# Patient Record
Sex: MALE | Race: WHITE | NOT HISPANIC OR LATINO | Employment: FULL TIME | URBAN - METROPOLITAN AREA
[De-identification: names, ages, dates, MRNs, and addresses within clinical notes are randomized per-mention and may not be internally consistent; named-entity substitution may affect disease eponyms.]

---

## 2017-11-27 ENCOUNTER — OFFICE VISIT (OUTPATIENT)
Dept: URGENT CARE | Age: 32
End: 2017-11-27
Payer: COMMERCIAL

## 2017-11-27 PROCEDURE — S9083 URGENT CARE CENTER GLOBAL: HCPCS | Performed by: FAMILY MEDICINE

## 2017-11-27 PROCEDURE — G0382 LEV 3 HOSP TYPE B ED VISIT: HCPCS | Performed by: FAMILY MEDICINE

## 2017-11-29 NOTE — PROGRESS NOTES
Assessment    1  URTI (acute upper respiratory infection) (465 9) (J06 9)    Plan  URTI (acute upper respiratory infection)    · Azithromycin 250 MG Oral Tablet (Zithromax Z-Malachi); TAKE 2 TABLETS ON DAY 1THEN TAKE 1 TABLET A DAY FOR 4 DAYS   · Continue with our present treatment plan ; Status:Complete;   Done: 70ZCB1096   · Drink plenty of fluids ; Status:Complete;   Done: 47PYI8156   · Resume activity to your tolerance ; Status:Complete;   Done: 87HEJ8424   · Use a cool mist humidifier in the room ; Status:Complete;   Done: 16UTP9027   · We suggest that you try a probiotic supplement ; Status:Complete;   Done: 37HIF2952    Discussion/Summary  Discussion Summary:   Continue over-the-counter medications as directed  If your symptoms are persisting or worsening then start the Z-Malachi as directed  Medication Side Effects Reviewed: Possible side effects of new medications were reviewed with the patient/guardian today  Understands and agrees with treatment plan: The treatment plan was reviewed with the patient/guardian  The patient/guardian understands and agrees with the treatment plan   Counseling Documentation With Imm: The patient was counseled regarding instructions for management,-- prognosis,-- patient and family education,-- impressions,-- risks and benefits of treatment options,-- importance of compliance with treatment  Follow Up Instructions: Follow Up with your Primary Care Provider in 3-4 days  If your symptoms worsen, go to the nearest [place] [address] Emergency Department  Chief Complaint    1  Cold Symptoms  Chief Complaint Free Text Note Form: Since Friday - c/o nasal congestion with PND, dry, scratchy throat and occ  dry cough  Noted some vertigo today  Denies fever, chills, ear pain or sinus pressure  Taking Vitamin C  History of Present Illness  HPI: The patient complains of nasal congestion postnasal drip and sore throat for the past three days   He does have an occasional cough which is dry  He did have an episode of feeling lightheaded and dizzy today but it only lasted a few seconds  He lay down and when he got up he felt better  Hospital Based Practices Required Assessment:  Pain Assessment  the patient states they have pain  The pain is located in the cough, congestion and throat  (on a scale of 0 to 10, the patient rates the pain at 7 )  Abuse And Domestic Violence Screen   Yes, the patient is safe at home  -- The patient states no one is hurting them  Depression And Suicide Screen  No, the patient has not had thoughts of hurting themself  No, the patient has not felt depressed in the past 7 days  Prefered Language is  [place]  Primary Language is  English  Cold Symptoms: Jessica Waldrop presents with complaints of cold symptoms  Associated symptoms include nasal congestion,-- post nasal drainage,-- scratchy throat,-- dry cough-- and-- swollen lymph nodes, but-- no sneezing,-- no runny nose,-- no sore throat,-- no hoarseness,-- no productive cough,-- no facial pressure,-- no facial pain,-- no headache,-- no plugged ear(s),-- no ear pain,-- no wheezing,-- no shortness of breath,-- no fatigue,-- no weakness,-- no nausea,-- no vomiting,-- no diarrhea,-- no fever-- and-- no chills  Review of Systems  Focused-Male:  Constitutional: as noted in HPI   ENT: as noted in HPI  Respiratory: as noted in HPI  Active Problems  1  Fatigue (780 79) (R53 83)   2  Headache (784 0) (R51)   3  Vitamin D deficiency (268 9) (E55 9)    Past Medical History  1  History of pharyngitis (V12 69) (Z87 09)   2  History of Otitis media, unspecified laterality  Active Problems And Past Medical History Reviewed: The active problems and past medical history were reviewed and updated today  Family History  Family History    1  Family history of Acute Myocardial Infarction (V17 3)   2  Family history of Diabetes Mellitus (V18 0)  Family History Reviewed:    The family history was reviewed and updated today  Social History   · Never A Smoker  Social History Reviewed: The social history was reviewed and updated today  Surgical History    1  History of Hernia Repair   2  History of Tonsillectomy With Adenoidectomy  Surgical History Reviewed: The surgical history was reviewed and updated today  Current Meds   1  Multi-Vitamin TABS; TAKE 1 TABLET DAILY; Therapy: (Recorded:13Mar2013) to Recorded   2  Vitamin C 500 MG Oral Tablet; Therapy: (0498 72 13 49) to Recorded  Medication List Reviewed: The medication list was reviewed and updated today  Allergies    1  No Known Drug Allergies    Vitals  Signs   Recorded: 97LJW3197 08:09PM   Temperature: 97 9 F, Oral  Heart Rate: 90  Pulse Quality: Regular  Respiration: 18  Systolic: 330, LUE, Sitting  Diastolic: 80, LUE, Sitting  Height: 5 ft 9 in  Weight: 165 lb 12 8 oz  BMI Calculated: 24 48  BSA Calculated: 1 91  O2 Saturation: 98  Pain Scale: 7    Physical Exam   Constitutional  General appearance: No acute distress, well appearing and well nourished  Eyes  Conjunctiva and lids: No swelling, erythema, or discharge  Pupils and irises: Equal, round and reactive to light  Ears, Nose, Mouth, and Throat  External inspection of ears and nose: Normal  -- TMs intact bilaterally with clear fluid in the middle ear  No erythema  -- Bilateral nasal congestion and erythema with no discharge  -- Bilateral tonsillar erythema with no soft tissue swelling no exudate  Pulmonary  Respiratory effort: No increased work of breathing or signs of respiratory distress  Auscultation of lungs: Clear to auscultation  Cardiovascular  Auscultation of heart: Normal rate and rhythm, normal S1 and S2, without murmurs  Lymphatic  Palpation of lymph nodes in neck: Abnormal   bilateral anterior cervical node enlargement, but-- no posterior cervical node enlargement,-- no submandibular node enlargement-- and-- no supraclavicular node enlargement    Psychiatric Orientation to person, place and time: Normal    Mood and affect: Normal        Signatures   Electronically signed by : MATTHEW Escalante; Nov 27 2017  8:32PM EST                       (Author)    Electronically signed by : Jerilyn Chase DO; Nov 28 2017  7:18AM EST                       (Co-author)

## 2018-02-12 ENCOUNTER — HOSPITAL ENCOUNTER (EMERGENCY)
Facility: HOSPITAL | Age: 33
Discharge: HOME | End: 2018-02-12
Attending: EMERGENCY MEDICINE
Payer: COMMERCIAL

## 2018-02-12 VITALS
WEIGHT: 160 LBS | RESPIRATION RATE: 18 BRPM | BODY MASS INDEX: 23.8 KG/M2 | SYSTOLIC BLOOD PRESSURE: 159 MMHG | TEMPERATURE: 99.4 F | OXYGEN SATURATION: 97 % | DIASTOLIC BLOOD PRESSURE: 80 MMHG | HEART RATE: 106 BPM

## 2018-02-12 DIAGNOSIS — J11.1 INFLUENZA-LIKE ILLNESS: Primary | ICD-10-CM

## 2018-02-12 PROCEDURE — 99283 EMERGENCY DEPT VISIT LOW MDM: CPT

## 2018-02-12 RX ORDER — ONDANSETRON 4 MG/1
4 TABLET, ORALLY DISINTEGRATING ORAL EVERY 6 HOURS PRN
Qty: 12 TABLET | Refills: 0 | Status: SHIPPED | OUTPATIENT
Start: 2018-02-12

## 2018-02-12 RX ORDER — ACETAMINOPHEN 325 MG/1
650 TABLET ORAL ONCE
Status: COMPLETED | OUTPATIENT
Start: 2018-02-12 | End: 2018-02-12

## 2018-02-12 RX ORDER — ONDANSETRON 4 MG/1
4 TABLET, ORALLY DISINTEGRATING ORAL ONCE
Status: COMPLETED | OUTPATIENT
Start: 2018-02-12 | End: 2018-02-12

## 2018-02-12 RX ORDER — IBUPROFEN 400 MG/1
400 TABLET ORAL ONCE
Status: COMPLETED | OUTPATIENT
Start: 2018-02-12 | End: 2018-02-12

## 2018-02-12 RX ADMIN — ACETAMINOPHEN 650 MG: 325 TABLET ORAL at 18:31

## 2018-02-12 RX ADMIN — IBUPROFEN 400 MG: 400 TABLET ORAL at 18:31

## 2018-02-12 RX ADMIN — ONDANSETRON 4 MG: 4 TABLET, ORALLY DISINTEGRATING ORAL at 18:06

## 2018-02-12 NOTE — DISCHARGE INSTRUCTIONS
Influenza   WHAT YOU NEED TO KNOW:   Influenza (the flu) is an infection caused by the influenza virus  The flu is easily spread when an infected person coughs, sneezes, or has close contact with others  You may be able to spread the flu to others for 1 week or longer after signs or symptoms appear  DISCHARGE INSTRUCTIONS:   Call 911 for any of the following:   · You have trouble breathing, and your lips look purple or blue  · You have a seizure  Seek care immediately if:   · You are dizzy, or you are urinating less or not at all  · You have a headache with a stiff neck, and you feel tired or confused  · You have new pain or pressure in your chest     · Your symptoms, such as shortness of breath, vomiting, or diarrhea, get worse  · Your symptoms, such as fever and coughing, seem to get better, but then get worse  Contact your healthcare provider if:   · You have new muscle pain or weakness  · You have questions or concerns about your condition or care  Medicines: You may need any of the following:  · Acetaminophen  decreases pain and fever  It is available without a doctor's order  Ask how much to take and how often to take it  Follow directions  Acetaminophen can cause liver damage if not taken correctly  · NSAIDs , such as ibuprofen, help decrease swelling, pain, and fever  This medicine is available with or without a doctor's order  NSAIDs can cause stomach bleeding or kidney problems in certain people  If you take blood thinner medicine, always ask your healthcare provider if NSAIDs are safe for you  Always read the medicine label and follow directions  · Antivirals  help fight a viral infection  · Take your medicine as directed  Contact your healthcare provider if you think your medicine is not helping or if you have side effects  Tell him or her if you are allergic to any medicine  Keep a list of the medicines, vitamins, and herbs you take   Include the amounts, and when and why you take them  Bring the list or the pill bottles to follow-up visits  Carry your medicine list with you in case of an emergency  Rest  as much as you can to help you recover  Drink liquids as directed  to help prevent dehydration  Ask how much liquid to drink each day and which liquids are best for you  Prevent the spread of influenza:   · Wash your hands often  Use soap and water  Wash your hands after you use the bathroom, change a child's diapers, or sneeze  Wash your hands before you prepare or eat food  Use gel hand cleanser when soap and water are not available  Do not touch your eyes, nose, or mouth unless you have washed your hands first            · Cover your mouth when you sneeze or cough  Cough into a tissue or the bend of your arm  · Clean shared items with a germ-killing   Clean table surfaces, doorknobs, and light switches  Do not share towels, silverware, and dishes with people who are sick  Wash bed sheets, towels, silverware, and dishes with soap and water  · Wear a mask  over your mouth and nose if you are sick or are near anyone who is sick  · Stay away from others  if you are sick  · Influenza vaccine  helps prevent influenza (flu)  Everyone older than 6 months should get a yearly influenza vaccine  Get the vaccine as soon as it is available, usually in September or October each year  Follow up with your healthcare provider as directed:  Write down your questions so you remember to ask them during your visits  © 2017 [address] [employer] Information is for End User's use only and may not be sold, redistributed or otherwise used for commercial purposes  All illustrations and images included in CareNotes® are the copyrighted property of [employer] , [employer]  or Alexis Thomason  The above information is an  only  It is not intended as medical advice for individual conditions or treatments   Talk to your doctor, nurse or pharmacist before following any medical regimen to see if it is safe and effective for you

## 2018-02-12 NOTE — ED PROVIDER NOTES
History  Chief Complaint   Patient presents with    Flu Symptoms     pt with sudden onset of flu like symptoms, bodyaches, nausea, cough, fever, chills     22-year-old male presents to the emergency department relating that he has developed a sudden onset flu-like illness  He relates that he has vomited a couple of times today starting at 10 or 11 in the morning  He has had chills  He relates that he has had a low-grade fever and felt extremely drain  He relates that he is extremely dehydrated and unable to keep anything down  Did have slightly loose stool  No nasal congestion, sore throat or cough  Healthy at baseline  Not on any medications  None       History reviewed  No pertinent past medical history  History reviewed  No pertinent surgical history  History reviewed  No pertinent family history  I have reviewed and agree with the history as documented  Social History   Substance Use Topics    Smoking status: Never Smoker    Smokeless tobacco: Never Used    Alcohol use No        Review of Systems   All other systems reviewed and are negative  Physical Exam  ED Triage Vitals [02/12/18 1644]   Temperature Pulse Respirations Blood Pressure SpO2   99 4 °F (37 4 °C) (!) 106 18 159/80 97 %      Temp Source Heart Rate Source Patient Position - Orthostatic VS BP Location FiO2 (%)   Oral Monitor Sitting Left arm --      Pain Score       8           Orthostatic Vital Signs  Vitals:    02/12/18 1644   BP: 159/80   Pulse: (!) 106   Patient Position - Orthostatic VS: Sitting       Physical Exam   Constitutional: He is oriented to person, place, and time  He appears well-developed and well-nourished  He appears distressed (Malaised appearing)  Eyes: Conjunctivae and EOM are normal    Neck: Normal range of motion  Cardiovascular: Regular rhythm  Mildly tachycardic  Pulmonary/Chest: Effort normal and breath sounds normal    Abdominal: Soft   Bowel sounds are normal  There is tenderness ( minimal epigastric)  There is no guarding  Lymphadenopathy:     He has no cervical adenopathy  Neurological: He is alert and oriented to person, place, and time  Skin: Skin is warm and dry  Psychiatric: He has a normal mood and affect  His behavior is normal    Nursing note and vitals reviewed  ED Medications  Medications   ondansetron (ZOFRAN-ODT) dispersible tablet 4 mg (4 mg Oral Given 2/12/18 1806)   acetaminophen (TYLENOL) tablet 650 mg (650 mg Oral Given 2/12/18 1831)   ibuprofen (MOTRIN) tablet 400 mg (400 mg Oral Given 2/12/18 1831)       Diagnostic Studies  Results Reviewed     None                 No orders to display              Procedures  Procedures       Phone Contacts  ED Phone Contact    ED Course  ED Course as of Feb 13 1852 Mon Feb 12, 2018 1915 Tolerating fluids at this time  Discussed supportive care  Patient had no further questions  Work note will be provided  MDM  CritCare Time    Disposition  Final diagnoses:   Influenza-like illness     Time reflects when diagnosis was documented in both MDM as applicable and the Disposition within this note     Time User Action Codes Description Comment    2/12/2018  6:28 PM Blake Rushing Influenza-like illness       ED Disposition     ED Disposition Condition Comment    Discharge  Yissel Adames discharge to home/self care      Condition at discharge: Good        Follow-up Information     Follow up With Specialties Details Why Contact Info    Berlin Krabbe, MD Family Medicine  if not improving in 3-5 days [address] [place] [address]  [phone]          Discharge Medication List as of 2/12/2018  7:17 PM      START taking these medications    Details   ondansetron (ZOFRAN-ODT) 4 mg disintegrating tablet Take 1 tablet (4 mg total) by mouth every 6 (six) hours as needed for nausea or vomiting, Starting Mon 2/12/2018, Normal           No discharge procedures on file      ED Provider  Electronically Signed by           Becky Teran MD  02/13/18 7561

## 2021-08-16 ENCOUNTER — OFFICE VISIT (OUTPATIENT)
Dept: FAMILY MEDICINE CLINIC | Facility: CLINIC | Age: 36
End: 2021-08-16
Payer: COMMERCIAL

## 2021-08-16 VITALS
WEIGHT: 158 LBS | OXYGEN SATURATION: 98 % | HEIGHT: 69 IN | TEMPERATURE: 97.6 F | HEART RATE: 77 BPM | DIASTOLIC BLOOD PRESSURE: 70 MMHG | SYSTOLIC BLOOD PRESSURE: 110 MMHG | BODY MASS INDEX: 23.4 KG/M2

## 2021-08-16 DIAGNOSIS — Z00.00 ANNUAL PHYSICAL EXAM: Primary | ICD-10-CM

## 2021-08-16 DIAGNOSIS — Z82.0 FAMILY HISTORY OF GUILLAIN-BARRE SYNDROME: ICD-10-CM

## 2021-08-16 PROCEDURE — 3008F BODY MASS INDEX DOCD: CPT | Performed by: PHYSICIAN ASSISTANT

## 2021-08-16 PROCEDURE — 99385 PREV VISIT NEW AGE 18-39: CPT | Performed by: PHYSICIAN ASSISTANT

## 2021-08-16 PROCEDURE — 1036F TOBACCO NON-USER: CPT | Performed by: PHYSICIAN ASSISTANT

## 2021-08-16 PROCEDURE — 3725F SCREEN DEPRESSION PERFORMED: CPT | Performed by: PHYSICIAN ASSISTANT

## 2021-08-16 NOTE — PATIENT INSTRUCTIONS

## 2021-08-16 NOTE — PROGRESS NOTES
[facility]    NAME: Patrice Garcia  AGE: 37 y o  SEX: male  : 1985     DATE: 2021     Assessment and Plan:     Problem List Items Addressed This Visit     None      Visit Diagnoses     Annual physical exam    -  Primary    Family history of Guillain-Standish syndrome        Relevant Orders    Ambulatory referral to Allergy      Pt is a 37 y o  male  Vaccines: UTD  Sexual Health: not sexually active    Discussed relative and absolute contraindications for COVID-19 vaccinations  At this time he has not absolute contraindications  He is concerned that unspecified autoimmune conditions in his family will lead to a significant reaction  Educated regarding COVID vaccination options  Encouraged patient that vaccination would likely be the best option for him  Referred to Allergist if he wants to discuss supervised vaccination or further testing  Immunizations and preventive care screenings were discussed with patient today  Appropriate education was printed on patient's after visit summary  Counseling:  Alcohol/drug use: discussed moderation in alcohol intake, the recommendations for healthy alcohol use, and avoidance of illicit drug use  Dental Health: discussed importance of regular tooth brushing, flossing, and dental visits  Sexual health: discussed sexually transmitted diseases, partner selection, use of condoms, avoidance of unintended pregnancy, and contraceptive alternatives  · Exercise: the importance of regular exercise/physical activity was discussed  Recommend exercise 3-5 times per week for at least 30 minutes  No follow-ups on file  Chief Complaint:     Chief Complaint   Patient presents with    Establish Care      History of Present Illness:     Adult Annual Physical   Patient here for a comprehensive physical exam  The patient reports no problems      Works at [employer] for 7 years  They are mandating COVID vaccine for all employees  He was not planning to get COVID and work requiring is like a "nose around my neck "    Possible prior ciguatera poisoning, after eating [place] in 2010  Evaluation at [facility]  Believes he had COVID 1 5 year ago  Mild URI symptoms with no significant allergic reaction or persistent symptoms  Mother has history of autoimmune conditions  1st Cousin has [address]  Brother believes he has recessive gene for autoimmune condition  Diet and Physical Activity  · Diet/Nutrition: well balanced diet and consuming 3-5 servings of fruits/vegetables daily  · Exercise: walking, strength training exercises and 3-4 times a week on average  Depression Screening  PHQ-9 Depression Screening    PHQ-9:   Frequency of the following problems over the past two weeks:      Little interest or pleasure in doing things: 0 - not at all  Feeling down, depressed, or hopeless: 0 - not at all  PHQ-2 Score: 0       General Health  · Sleep: sleeps well  · Hearing: normal - bilateral   · Vision: no vision problems and most recent eye exam <1 year ago  · Dental: regular dental visits and brushes teeth twice daily   Health  · History of STDs?: no      Review of Systems:     Review of Systems   Constitutional: Negative for activity change, chills, fatigue, fever and unexpected weight change  HENT: Negative for rhinorrhea and sore throat  Respiratory: Negative for cough, shortness of breath and wheezing  Cardiovascular: Negative for chest pain, palpitations and leg swelling  Gastrointestinal: Negative for constipation, diarrhea, nausea and vomiting  Musculoskeletal: Negative for back pain, neck pain and neck stiffness  Skin: Negative for rash  Allergic/Immunologic: Negative for environmental allergies and food allergies  Neurological: Negative for dizziness, weakness and headaches     Psychiatric/Behavioral: Negative for dysphoric mood and sleep disturbance  The patient is not nervous/anxious  Past Medical History:     History reviewed  No pertinent past medical history  Past Surgical History:     History reviewed  No pertinent surgical history  Social History:     Social History     Socioeconomic History    Marital status: Single     Spouse name: None    Number of children: None    Years of education: None    Highest education level: None   Occupational History    None   Tobacco Use    Smoking status: Never Smoker    Smokeless tobacco: Never Used   Vaping Use    Vaping Use: Never used   Substance and Sexual Activity    Alcohol use: No    Drug use: No    Sexual activity: Not Currently   Other Topics Concern    None   Social History Narrative    None     Social Determinants of Health     Financial Resource Strain:     Difficulty of Paying Living Expenses:    Food Insecurity:     Worried About Running Out of Food in the Last Year:     Ran Out of Food in the Last Year:    Transportation Needs:     Lack of Transportation (Medical):      Lack of Transportation (Non-Medical):    Physical Activity:     Days of Exercise per Week:     Minutes of Exercise per Session:    Stress:     Feeling of Stress :    Social Connections:     Frequency of Communication with Friends and Family:     Frequency of Social Gatherings with Friends and Family:     Attends Orthodox Services:     Active Member of Clubs or Organizations:     Attends Club or Organization Meetings:     Marital Status:    Intimate Partner Violence:     Fear of Current or Ex-Partner:     Emotionally Abused:     Physically Abused:     Sexually Abused:       Family History:     Family History   Problem Relation Age of Onset    No Known Problems Mother     No Known Problems Father       Current Medications:     Current Outpatient Medications   Medication Sig Dispense Refill    ondansetron (ZOFRAN-ODT) 4 mg disintegrating tablet Take 1 tablet (4 mg total) by mouth every 6 (six) hours as needed for nausea or vomiting (Patient not taking: Reported on 8/16/2021) 12 tablet 0     No current facility-administered medications for this visit  Allergies:     No Known Allergies   Physical Exam:     /70 (BP Location: Left arm, Patient Position: Sitting, Cuff Size: Standard)   Pulse 77   Temp 97 6 °F (36 4 °C)   Ht 5' 9" (1 753 m)   Wt 71 7 kg (158 lb)   SpO2 98%   BMI 23 33 kg/m²     Physical Exam  Constitutional:       Appearance: He is well-developed  HENT:      Head: Normocephalic and atraumatic  Mouth/Throat:      Pharynx: No oropharyngeal exudate  Eyes:      Pupils: Pupils are equal, round, and reactive to light  Neck:      Thyroid: No thyromegaly  Cardiovascular:      Rate and Rhythm: Normal rate and regular rhythm  Heart sounds: Normal heart sounds  No murmur heard  Pulmonary:      Effort: Pulmonary effort is normal  No respiratory distress  Breath sounds: Normal breath sounds  No wheezing  Abdominal:      General: Bowel sounds are normal  There is no distension  Palpations: Abdomen is soft  Tenderness: There is no abdominal tenderness  Hernia: No hernia is present  Musculoskeletal:         General: Normal range of motion  Cervical back: Normal range of motion and neck supple  Lymphadenopathy:      Cervical: No cervical adenopathy  Skin:     General: Skin is warm  Neurological:      Mental Status: He is alert and oriented to person, place, and time  Psychiatric:         Mood and Affect: Mood normal          Behavior: Behavior normal          Thought Content:  Thought content normal           Toyin Torres PA-C   [id number]

## 2022-07-28 ENCOUNTER — OFFICE VISIT (OUTPATIENT)
Dept: URGENT CARE | Age: 37
End: 2022-07-28
Payer: COMMERCIAL

## 2022-07-28 VITALS
HEIGHT: 69 IN | SYSTOLIC BLOOD PRESSURE: 110 MMHG | TEMPERATURE: 97.9 F | HEART RATE: 84 BPM | BODY MASS INDEX: 23.4 KG/M2 | DIASTOLIC BLOOD PRESSURE: 70 MMHG | RESPIRATION RATE: 18 BRPM | WEIGHT: 158 LBS

## 2022-07-28 DIAGNOSIS — N45.1 EPIDIDYMITIS: Primary | ICD-10-CM

## 2022-07-28 PROCEDURE — S9083 URGENT CARE CENTER GLOBAL: HCPCS | Performed by: NURSE PRACTITIONER

## 2022-07-28 PROCEDURE — G0382 LEV 3 HOSP TYPE B ED VISIT: HCPCS | Performed by: NURSE PRACTITIONER

## 2022-07-28 PROCEDURE — 87086 URINE CULTURE/COLONY COUNT: CPT

## 2022-07-28 PROCEDURE — 87591 N.GONORRHOEAE DNA AMP PROB: CPT

## 2022-07-28 PROCEDURE — 87491 CHLMYD TRACH DNA AMP PROBE: CPT

## 2022-07-28 RX ORDER — LEVOFLOXACIN 500 MG/1
500 TABLET, FILM COATED ORAL EVERY 24 HOURS
Qty: 7 TABLET | Refills: 0 | Status: SHIPPED | OUTPATIENT
Start: 2022-07-28 | End: 2022-08-05 | Stop reason: SDUPTHER

## 2022-07-28 NOTE — PROGRESS NOTES
[facility]        NAME: Debo Johnson is a 31 y o  male  : 1985    MRN: [id number]  DATE: 2022  TIME: 8:43 AM    Assessment and Plan   Epididymitis [N45 1]  1  Epididymitis  levofloxacin (LEVAQUIN) 500 mg tablet    Urine culture    Chlamydia/GC amplified DNA by PCR    Ambulatory referral to Urology         Patient Instructions     --Take antibiotic as prescribed  Take with food  --Warm compresses, elevated testicles 2-3 times a day  --Motrin as needed for pain  --Will send urine for culture, calling when results obtained (anticipate 48-72 hours  --Follow-up with PCP and/or urology for ongoing/recurrent symptoms over the next week  --Go to ER for any worsening pain, swelling, fever/chills, nausea, vomiting    Chief Complaint     Chief Complaint   Patient presents with    Testicle Pain     Patient been having issues with testicle pain and warmest for less then 1 week         History of Present Illness       Here with complaints of testicular pain x 1 week  Waxes and wanes  Has improved somewhat over the past couple of days  Both sides  No increase with cough, strain  Seems a little swollen and red at times  Intermittent dysuria, frequency  No associated bulges, abdominal pain, back pain, urethral discharge, fever/chills, N/V  No OTC analgesics  Sexually active  Denies  prior history of infections including STI's  No known injury/trauma, although he was mountain biking recently  Review of Systems   Review of Systems   Constitutional: Negative for fever  Gastrointestinal: Negative for abdominal pain, nausea and vomiting  Genitourinary: Positive for dysuria and frequency           Current Medications       Current Outpatient Medications:     levofloxacin (LEVAQUIN) 500 mg tablet, Take 1 tablet (500 mg total) by mouth every 24 hours for 7 days, Disp: 7 tablet, Rfl: 0    ondansetron (ZOFRAN-ODT) 4 mg disintegrating tablet, Take 1 tablet (4 mg total) by mouth every 6 (six) hours as needed for nausea or vomiting (Patient not taking: Reported on 8/16/2021), Disp: 12 tablet, Rfl: 0    Current Allergies     Allergies as of 07/28/2022    (No Known Allergies)            The following portions of the patient's history were reviewed and updated as appropriate: allergies, current medications, past family history, past medical history, past social history, past surgical history and problem list      No past medical history on file  No past surgical history on file  Family History   Problem Relation Age of Onset    No Known Problems Mother     No Known Problems Father          Medications have been verified  Objective   /70 (BP Location: Right arm, Patient Position: Sitting, Cuff Size: Standard)   Pulse 84   Temp 97 9 °F (36 6 °C)   Resp 18   Ht 5' 9" (1 753 m)   Wt 71 7 kg (158 lb)   BMI 23 33 kg/m²   No LMP for male patient  Physical Exam     Physical Exam  Abdominal:      General: Abdomen is flat  Tenderness: There is no abdominal tenderness  Genitourinary:     Penis: Normal        Comments: Testicles with mild erythema, diffuse swelling, no focal tenderness  Small, nontender cyst palpated on left spermatic cord  No unusual lumps, masses otherwise, including inguinal hernias  Negative Prehn  No urethral discharge  Neurological:      Mental Status: He is alert     Psychiatric:         Mood and Affect: Mood normal

## 2022-07-28 NOTE — PATIENT INSTRUCTIONS
--Take antibiotic as prescribed  Take with food  --Warm compresses, elevated testicles 2-3 times a day  --Motrin as needed for pain  --Will send urine for culture, calling when results obtained (anticipate 48-72 hours  --Follow-up with PCP and/or urology for ongoing/recurrent symptoms over the next week      --Go to ER for any worsening pain, swelling, fever/chills, nausea, vomiting

## 2022-07-29 LAB
BACTERIA UR CULT: NORMAL
C TRACH DNA SPEC QL NAA+PROBE: NEGATIVE
N GONORRHOEA DNA SPEC QL NAA+PROBE: NEGATIVE

## 2022-08-05 ENCOUNTER — OFFICE VISIT (OUTPATIENT)
Dept: UROLOGY | Facility: AMBULATORY SURGERY CENTER | Age: 37
End: 2022-08-05
Payer: COMMERCIAL

## 2022-08-05 ENCOUNTER — HOSPITAL ENCOUNTER (OUTPATIENT)
Dept: RADIOLOGY | Age: 37
Discharge: HOME | End: 2022-08-05
Payer: COMMERCIAL

## 2022-08-05 VITALS
WEIGHT: 158 LBS | BODY MASS INDEX: 23.4 KG/M2 | DIASTOLIC BLOOD PRESSURE: 82 MMHG | SYSTOLIC BLOOD PRESSURE: 110 MMHG | HEART RATE: 48 BPM | HEIGHT: 69 IN

## 2022-08-05 DIAGNOSIS — N45.1 EPIDIDYMITIS: ICD-10-CM

## 2022-08-05 DIAGNOSIS — R30.0 DYSURIA: ICD-10-CM

## 2022-08-05 DIAGNOSIS — N45.1 EPIDIDYMITIS: Primary | ICD-10-CM

## 2022-08-05 PROCEDURE — 76870 US EXAM SCROTUM: CPT

## 2022-08-05 PROCEDURE — 99204 OFFICE O/P NEW MOD 45 MIN: CPT | Performed by: NURSE PRACTITIONER

## 2022-08-05 RX ORDER — LEVOFLOXACIN 500 MG/1
500 TABLET, FILM COATED ORAL EVERY 24 HOURS
Qty: 7 TABLET | Refills: 0 | Status: SHIPPED | OUTPATIENT
Start: 2022-08-05 | End: 2022-08-12

## 2022-08-05 NOTE — PROGRESS NOTES
8/5/2022    Max Jose  1985  [id number]      Assessment  -Epididymitis    Discussion/Plan  Majo Marlow is a 31 y o  male who presents in consultation    1  Epididymitis- we had a lengthy discussion reviewing symptoms of acute epididymitis  Discussed the results of his prior urine culture which was negative for infection  Unsure if symptoms are secondary to epididymitis verses injury from mountain biking excursion  He expresses concern and continues to report mild symptoms as noted below  Patient is requesting repeat urine culture as well as a semen culture test   We discussed obtaining a scrotal ultrasound for further evaluation  His physical examination was unremarkable  Reviewed supportive measures with use of OTC NSAIDs, scrotal support with physical activity, and warm compress and soaks  Call with results of testing  He will then follow-up in 4 months for re-evaluation of his symptoms  Patient was advised to call sooner with any questions or issues     -All questions answered, patient agrees with plan      History of Present Illness  31 y o  male who presents in consultation today for evaluation of epididymitis  He was recently evaluated at an urgent care center on 07/28/2022 for symptoms of dysuria and testicular discomfort  Patient states he engaged in sexual activity a few weeks prior as well as mountain biking for 3 consecutive days  He notes mild redness and swelling  Patient was presumed to have epididymitis and was prescribed a 7 day course of Levaquin  Urine culture and GC chlamydia testing were negative  He continues to report symptoms of testicular pressure, discomfort and eden rectal area and feeling of burning sensation  Patient has since had sexual activity with same partner  He denies any pain with ejaculation or hematospermia  Patient denies any additional lower urinary tract symptoms or gross hematuria    He denies any prior urologic history, surgical intervention, or instrumentation  Patient denies any prior history of undescended testicle or testicular torsion  He states his grandfather had a history of prostate cancer  Review of Systems  Review of Systems   Constitutional: Negative  HENT: Negative  Respiratory: Negative  Cardiovascular: Negative  Gastrointestinal: Negative  Genitourinary: Positive for dysuria and testicular pain  Negative for decreased urine volume, difficulty urinating, flank pain, frequency, hematuria, penile discharge, penile pain, penile swelling, scrotal swelling and urgency  Musculoskeletal: Negative  Skin: Negative  Neurological: Negative  Psychiatric/Behavioral: Negative        AUA SYMPTOM SCORE    Flowsheet Row Most Recent Value   AUA SYMPTOM SCORE    How often have you had a sensation of not emptying your bladder completely after you finished urinating? 0 (P)     How often have you had to urinate again less than two hours after you finished urinating? 5 (P)     How often have you found you stopped and started again several times when you urinate? 1 (P)     How often have you found it difficult to postpone urination? 3 (P)     How often have you had a weak urinary stream? 2 (P)     How often have you had to push or strain to begin urination? 0 (P)     How many times did you most typically get up to urinate from the time you went to bed at night until the time you got up in the morning? 5 (P)     Quality of Life: If you were to spend the rest of your life with your urinary condition just the way it is now, how would you feel about that? 6 (P)     AUA SYMPTOM SCORE 16 (P)           Past Medical History  Past Medical History:   Diagnosis Date    Epididymitis 07/28/2022       Past Social History  Past Surgical History:   Procedure Laterality Date    HERNIA REPAIR  0    8 months old       Past Family History  Family History   Problem Relation Age of Onset    Urolithiasis Mother         on and off    No Known Problems Father     Cancer Maternal Grandmother         lung cancer/    Sandeep Quinn Cancer Paternal Grandfather         Prostate cancer/     Diabetes Paternal Grandfather         diagnosed in his 45s       Past Social history  Social History     Socioeconomic History    Marital status: Single     Spouse name: Not on file    Number of children: Not on file    Years of education: Not on file    Highest education level: Not on file   Occupational History    Not on file   Tobacco Use    Smoking status: Never Smoker    Smokeless tobacco: Never Used    Tobacco comment: N/A   Vaping Use    Vaping Use: Never used   Substance and Sexual Activity    Alcohol use: Not Currently     Comment: less than 2 drinks a month    Drug use: No    Sexual activity: Not Currently   Other Topics Concern    Not on file   Social History Narrative    Not on file     Social Determinants of Health     Financial Resource Strain: Not on file   Food Insecurity: Not on file   Transportation Needs: Not on file   Physical Activity: Not on file   Stress: Not on file   Social Connections: Not on file   Intimate Partner Violence: Not on file   Housing Stability: Not on file       Current Medications  Current Outpatient Medications   Medication Sig Dispense Refill    levofloxacin (LEVAQUIN) 500 mg tablet Take 1 tablet (500 mg total) by mouth every 24 hours for 7 days 7 tablet 0    ondansetron (ZOFRAN-ODT) 4 mg disintegrating tablet Take 1 tablet (4 mg total) by mouth every 6 (six) hours as needed for nausea or vomiting (Patient not taking: No sig reported) 12 tablet 0     No current facility-administered medications for this visit  Allergies  No Known Allergies    Past Medical History, Social History, Family History, medications and allergies were reviewed      Vitals  Vitals:    22 1059   BP: 110/82   Pulse: (!) 48   Weight: 71 7 kg (158 lb)   Height: 5' 9" (1 753 m)       Physical Exam  Physical Exam  Constitutional: Appearance: Normal appearance  He is well-developed  HENT:      Head: Normocephalic  Eyes:      Pupils: Pupils are equal, round, and reactive to light  Pulmonary:      Effort: Pulmonary effort is normal    Abdominal:      Palpations: Abdomen is soft  Genitourinary:     Penis: Normal        Testes: Normal       Prostate: Normal       Rectum: Normal       Comments: Penis circumcised, testicles descended bilaterally, there is no scrotal edema or erythema, scrotum nontender  Urethral meatus patent  No inguinal hernia palpated  Prostate 35 g, smooth, nontender, no nodules  Musculoskeletal:         General: Normal range of motion  Cervical back: Normal range of motion  Skin:     General: Skin is warm and dry  Neurological:      General: No focal deficit present  Mental Status: He is alert and oriented to person, place, and time  Psychiatric:         Mood and Affect: Mood normal          Behavior: Behavior normal          Thought Content: Thought content normal          Judgment: Judgment normal          Results    I have personally reviewed all pertinent lab results and reviewed with patient  No results found for: PSA  No results found for: GLUCOSE, CALCIUM, NA, K, CO2, CL, BUN, CREATININE  No results found for: WBC, HGB, HCT, MCV, PLT  No results found for this or any previous visit (from the past 1 hour(s))

## 2022-08-08 ENCOUNTER — APPOINTMENT (OUTPATIENT)
Dept: LAB | Facility: CLINIC | Age: 37
End: 2022-08-08
Payer: COMMERCIAL

## 2022-08-08 DIAGNOSIS — R30.0 DYSURIA: ICD-10-CM

## 2022-08-08 PROCEDURE — 87070 CULTURE OTHR SPECIMN AEROBIC: CPT

## 2022-08-08 PROCEDURE — 87086 URINE CULTURE/COLONY COUNT: CPT

## 2022-08-08 PROCEDURE — 87205 SMEAR GRAM STAIN: CPT

## 2022-08-09 ENCOUNTER — TELEPHONE (OUTPATIENT)
Dept: UROLOGY | Facility: AMBULATORY SURGERY CENTER | Age: 37
End: 2022-08-09

## 2022-08-09 DIAGNOSIS — N41.0 ACUTE PROSTATITIS: Primary | ICD-10-CM

## 2022-08-09 LAB — BACTERIA UR CULT: NORMAL

## 2022-08-09 NOTE — TELEPHONE ENCOUNTER
----- Message from [id number] Ivy Roseann sent at 8/8/2022  8:48 PM EDT -----  Please inform patient results of scrotal ultrasound were unremarkable  Left varicocele was noted, but this is a benign finding  Varicocele may have been exacerbated after recent mountain bike excursion  Please review supportive measures  Plan to follow up as scheduled for re-evaluation

## 2022-08-09 NOTE — TELEPHONE ENCOUNTER
LM per communication consent with all testing results as listed by the AP  Left supportive measures for the varicocele

## 2022-08-09 NOTE — TELEPHONE ENCOUNTER
[facility] [facility]  Please inform patient results of urine culture were also negative for infection

## 2022-08-09 NOTE — TELEPHONE ENCOUNTER
[facility] [facility]  Please inform patient results of semen culture were negative   Urine culture remains pending   We will call once finalized

## 2022-08-11 LAB
BACTERIA SMN CULT: ABNORMAL
GRAM STN SPEC: ABNORMAL

## 2022-08-12 NOTE — TELEPHONE ENCOUNTER
Pt saw a change in his culture result on Mychart and would like to know if his abx is going to change now that his result is Abnormal or is there some other change  Please call pt to address concerns

## 2022-08-12 NOTE — TELEPHONE ENCOUNTER
Patient called to check the status of the previous message       Patient can be reached at [phone]

## 2022-08-15 RX ORDER — DOXYCYCLINE 100 MG/1
100 CAPSULE ORAL 2 TIMES DAILY
Qty: 28 CAPSULE | Refills: 0 | Status: SHIPPED | OUTPATIENT
Start: 2022-08-15 | End: 2022-08-27 | Stop reason: SDUPTHER

## 2022-08-15 NOTE — TELEPHONE ENCOUNTER
Spoke with patient  Advised on medication being called into [facility] "[employer] for him to  and begin  Advised patient to call back if symptoms do not improve after 2 weeks  He states he understands

## 2022-08-15 NOTE — TELEPHONE ENCOUNTER
If patient still symptomatic, prescription for antibiotic was sent to his pharmacy  Advise patient to call office if he does not notice any improvement of symptoms after 2 weeks

## 2022-08-22 NOTE — PROGRESS NOTES
ANNUAL PHYSICAL    Date of Service: 22  Primary Care Provider:   Miguel Pearce MD       Name: Peggy Farr       : 1985       Age:36 y o  Sex: male      MRN: [id number]      Chief Complaint:Physical Exam       Assessment and Plan:  31 y o  male exam      1  Health Maintenance  - Labs: as below   - Immunizations: Reviewed  Recommend yearly flu vaccine  2  Other diagnoses addressed today:   Problem List Items Addressed This Visit        Genitourinary    Epididymitis     Currently being treated with doxycycline, discussed that ongoing symptoms do not necessarily correlate to untreated infection  Continue with supportive care, instructed patient to follow-up with urology if symptoms persist              Other    Brachioradialis muscle tenderness     Ongoing symptoms from remote injury  No dysfunction  Will obtain US to evaluate further  Relevant Orders    US MSK limited      Other Visit Diagnoses     Muscle strain of left forearm, sequela    -  Primary    Relevant Orders    US MSK limited    Annual physical exam        Relevant Orders    Comprehensive metabolic panel    Lipid panel    CBC and differential    BMI 23 0-23 9, adult        Need for hepatitis C screening test        Relevant Orders    Hepatitis C Antibody ([facility])    Screening for HIV (human immunodeficiency virus)        Relevant Orders    HIV 1/2 Antigen/Antibody (4th Generation) w Reflex [facility]    Pilonidal cyst               Immunizations and preventive care screenings were discussed with patient today  Appropriate education was printed on patient's after visit summary  Counseling:  Alcohol/drug use: discussed moderation in alcohol intake, the recommendations for healthy alcohol use, and avoidance of illicit drug use  Dental Health: discussed importance of regular tooth brushing, flossing, and dental visits      Injury prevention: discussed safety/seat belts, safety helmets, smoke detectors, carbon dioxide detectors    Exercise: the importance of regular exercise/physical activity was discussed  Recommend exercise 3-5 times per week for at least 30 minutes  RTC 1 year for annual HM visit or sooner PRN    Subjective:    Vicenta Soriano is a 31 y o  male and is here for his comprehensive physical exam      Acute complaints: see separate note    Diet and Physical Activity  Diet/Nutrition: does follow a well balanced diet  Takes multiple supplements  Exercise: gym for several hours 4 times weekly    General Health  Vision: no vision problems and goes for regular eye exams  Dental: regular dental visits          Histories Updated and Reviewed 8/23/2022:  Patient's Medications   New Prescriptions    No medications on file   Previous Medications    DOXYCYCLINE MONOHYDRATE (MONODOX) 100 MG CAPSULE    Take 1 capsule (100 mg total) by mouth 2 (two) times a day for 14 days   Modified Medications    No medications on file   Discontinued Medications    ONDANSETRON (ZOFRAN-ODT) 4 MG DISINTEGRATING TABLET    Take 1 tablet (4 mg total) by mouth every 6 (six) hours as needed for nausea or vomiting     No Known Allergies  Past Medical History:   Diagnosis Date    Epididymitis 07/28/2022     Social History     Socioeconomic History    Marital status: Single     Spouse name: Not on file    Number of children: Not on file    Years of education: Not on file    Highest education level: Not on file   Occupational History    Not on file   Tobacco Use    Smoking status: Never Smoker    Smokeless tobacco: Never Used    Tobacco comment: N/A   Vaping Use    Vaping Use: Never used   Substance and Sexual Activity    Alcohol use: Not Currently     Comment: less than 2 drinks a month    Drug use: No    Sexual activity: Not Currently   Other Topics Concern    Not on file   Social History Narrative    Not on file     Social Determinants of Health     Financial Resource Strain: Not on file   Food Insecurity: Not on file   Transportation Needs: Not on file   Physical Activity: Not on file   Stress: Not on file   Social Connections: Not on file   Intimate Partner Violence: Not on file   Housing Stability: Not on file     Immunization History   Administered Date(s) Administered    COVID-19 PFIZER VACCINE 0 3 ML IM 08/17/2021       PHQ-2/9 Depression Screening    Little interest or pleasure in doing things: 0 - not at all  Feeling down, depressed, or hopeless: 0 - not at all  PHQ-2 Score: 0  PHQ-2 Interpretation: Negative depression screen         Objective:  /78   Pulse 78   Temp (!) 95 7 °F (35 4 °C)   Resp 17   Ht 5' 9" (1 753 m)   Wt 71 7 kg (158 lb)   SpO2 100%   BMI 23 33 kg/m²   BP Readings from Last 3 Encounters:   08/23/22 124/78   08/05/22 110/82   07/28/22 110/70      Wt Readings from Last 3 Encounters:   08/23/22 71 7 kg (158 lb)   08/05/22 71 7 kg (158 lb)   07/28/22 71 7 kg (158 lb)      Physical Exam  Constitutional:       General: He is not in acute distress  Appearance: Normal appearance  He is not ill-appearing or toxic-appearing  HENT:      Head: Normocephalic and atraumatic  Right Ear: Tympanic membrane, ear canal and external ear normal       Left Ear: Tympanic membrane, ear canal and external ear normal       Nose: Nose normal       Mouth/Throat:      Mouth: Mucous membranes are moist    Eyes:      Extraocular Movements: Extraocular movements intact  Conjunctiva/sclera: Conjunctivae normal    Cardiovascular:      Rate and Rhythm: Normal rate and regular rhythm  Pulses: Normal pulses  Heart sounds: Normal heart sounds  No murmur heard  No gallop  Pulmonary:      Effort: Pulmonary effort is normal  No respiratory distress  Breath sounds: Normal breath sounds  No stridor  No wheezing, rhonchi or rales  Abdominal:      General: There is no distension  Palpations: Abdomen is soft  Tenderness: There is no abdominal tenderness   There is no guarding or rebound  Musculoskeletal:      Left elbow: No swelling  Normal range of motion  No tenderness  Left forearm: Tenderness present  No swelling, edema, deformity, lacerations or bony tenderness  Left wrist: No swelling, deformity, effusion, tenderness or crepitus  Normal range of motion  Arms:       Cervical back: Normal range of motion and neck supple  No rigidity or tenderness  Right lower leg: No edema  Left lower leg: No edema  Comments: Normal strength in left elbow on flexion, extension, supination, pronation  Resisted elbow flexion and supination reproduce symptoms  Lymphadenopathy:      Cervical: No cervical adenopathy  Skin:     General: Skin is warm and dry  Findings: No erythema or rash  Neurological:      General: No focal deficit present  Mental Status: He is alert and oriented to person, place, and time  Psychiatric:         Mood and Affect: Mood normal          Behavior: Behavior normal          Patient Care Team:  Jamey Phalen, MD as PCP - General (Family Medicine)    Jamey Phalen, MD    Note: Portions of the record may have been created with voice recognition software  Occasional wrong word or "sound a like" substitutions may have occurred due to the inherent limitations of voice recognition software  Read the chart carefully and recognize, using context, where substitutions have occurred

## 2022-08-23 ENCOUNTER — APPOINTMENT (OUTPATIENT)
Dept: LAB | Facility: CLINIC | Age: 37
End: 2022-08-23
Payer: COMMERCIAL

## 2022-08-23 ENCOUNTER — OFFICE VISIT (OUTPATIENT)
Dept: FAMILY MEDICINE CLINIC | Facility: CLINIC | Age: 37
End: 2022-08-23
Payer: COMMERCIAL

## 2022-08-23 VITALS
OXYGEN SATURATION: 100 % | HEIGHT: 69 IN | RESPIRATION RATE: 17 BRPM | SYSTOLIC BLOOD PRESSURE: 124 MMHG | BODY MASS INDEX: 23.4 KG/M2 | DIASTOLIC BLOOD PRESSURE: 78 MMHG | HEART RATE: 78 BPM | TEMPERATURE: 95.7 F | WEIGHT: 158 LBS

## 2022-08-23 DIAGNOSIS — Z11.4 SCREENING FOR HIV (HUMAN IMMUNODEFICIENCY VIRUS): ICD-10-CM

## 2022-08-23 DIAGNOSIS — Z00.00 ANNUAL PHYSICAL EXAM: ICD-10-CM

## 2022-08-23 DIAGNOSIS — S56.912S: Primary | ICD-10-CM

## 2022-08-23 DIAGNOSIS — L05.91 PILONIDAL CYST: ICD-10-CM

## 2022-08-23 DIAGNOSIS — N45.1 EPIDIDYMITIS: ICD-10-CM

## 2022-08-23 DIAGNOSIS — Z11.59 NEED FOR HEPATITIS C SCREENING TEST: ICD-10-CM

## 2022-08-23 DIAGNOSIS — M79.18 BRACHIORADIALIS MUSCLE TENDERNESS: ICD-10-CM

## 2022-08-23 LAB
ALBUMIN SERPL BCP-MCNC: 4.5 G/DL (ref 3.5–5)
ALP SERPL-CCNC: 63 U/L (ref 34–104)
ALT SERPL W P-5'-P-CCNC: 11 U/L (ref 7–52)
ANION GAP SERPL CALCULATED.3IONS-SCNC: 5 MMOL/L (ref 4–13)
AST SERPL W P-5'-P-CCNC: 22 U/L (ref 13–39)
BASOPHILS # BLD AUTO: 0.05 THOUSANDS/ΜL (ref 0–0.1)
BASOPHILS NFR BLD AUTO: 1 % (ref 0–1)
BILIRUB SERPL-MCNC: 2 MG/DL (ref 0.2–1)
BUN SERPL-MCNC: 13 MG/DL (ref 5–25)
CALCIUM SERPL-MCNC: 9.4 MG/DL (ref 8.4–10.2)
CHLORIDE SERPL-SCNC: 104 MMOL/L (ref 96–108)
CHOLEST SERPL-MCNC: 173 MG/DL
CO2 SERPL-SCNC: 29 MMOL/L (ref 21–32)
CREAT SERPL-MCNC: 1.12 MG/DL (ref 0.6–1.3)
EOSINOPHIL # BLD AUTO: 0.19 THOUSAND/ΜL (ref 0–0.61)
EOSINOPHIL NFR BLD AUTO: 3 % (ref 0–6)
ERYTHROCYTE [DISTWIDTH] IN BLOOD BY AUTOMATED COUNT: 13.9 % (ref 11.6–15.1)
GFR SERPL CREATININE-BSD FRML MDRD: 85 ML/MIN/1.73SQ M
GLUCOSE P FAST SERPL-MCNC: 85 MG/DL (ref 65–99)
HCT VFR BLD AUTO: 45.6 % (ref 36.5–49.3)
HCV AB SER QL: NORMAL
HDLC SERPL-MCNC: 73 MG/DL
HGB BLD-MCNC: 15.1 G/DL (ref 12–17)
IMM GRANULOCYTES # BLD AUTO: 0.03 THOUSAND/UL (ref 0–0.2)
IMM GRANULOCYTES NFR BLD AUTO: 1 % (ref 0–2)
LDLC SERPL CALC-MCNC: 90 MG/DL (ref 0–100)
LYMPHOCYTES # BLD AUTO: 2.16 THOUSANDS/ΜL (ref 0.6–4.47)
LYMPHOCYTES NFR BLD AUTO: 36 % (ref 14–44)
MCH RBC QN AUTO: 29.6 PG (ref 26.8–34.3)
MCHC RBC AUTO-ENTMCNC: 33.1 G/DL (ref 31.4–37.4)
MCV RBC AUTO: 89 FL (ref 82–98)
MONOCYTES # BLD AUTO: 0.47 THOUSAND/ΜL (ref 0.17–1.22)
MONOCYTES NFR BLD AUTO: 8 % (ref 4–12)
NEUTROPHILS # BLD AUTO: 3.06 THOUSANDS/ΜL (ref 1.85–7.62)
NEUTS SEG NFR BLD AUTO: 51 % (ref 43–75)
NONHDLC SERPL-MCNC: 100 MG/DL
NRBC BLD AUTO-RTO: 0 /100 WBCS
PLATELET # BLD AUTO: 223 THOUSANDS/UL (ref 149–390)
PMV BLD AUTO: 9.9 FL (ref 8.9–12.7)
POTASSIUM SERPL-SCNC: 4.5 MMOL/L (ref 3.5–5.3)
PROT SERPL-MCNC: 7.2 G/DL (ref 6.4–8.4)
RBC # BLD AUTO: 5.1 MILLION/UL (ref 3.88–5.62)
SODIUM SERPL-SCNC: 138 MMOL/L (ref 135–147)
TRIGL SERPL-MCNC: 52 MG/DL
WBC # BLD AUTO: 5.96 THOUSAND/UL (ref 4.31–10.16)

## 2022-08-23 PROCEDURE — 3725F SCREEN DEPRESSION PERFORMED: CPT | Performed by: FAMILY MEDICINE

## 2022-08-23 PROCEDURE — 85025 COMPLETE CBC W/AUTO DIFF WBC: CPT

## 2022-08-23 PROCEDURE — 99213 OFFICE O/P EST LOW 20 MIN: CPT | Performed by: FAMILY MEDICINE

## 2022-08-23 PROCEDURE — 86803 HEPATITIS C AB TEST: CPT

## 2022-08-23 PROCEDURE — 80061 LIPID PANEL: CPT

## 2022-08-23 PROCEDURE — 36415 COLL VENOUS BLD VENIPUNCTURE: CPT

## 2022-08-23 PROCEDURE — 87389 HIV-1 AG W/HIV-1&-2 AB AG IA: CPT

## 2022-08-23 PROCEDURE — 80053 COMPREHEN METABOLIC PANEL: CPT

## 2022-08-23 PROCEDURE — 99395 PREV VISIT EST AGE 18-39: CPT | Performed by: FAMILY MEDICINE

## 2022-08-23 NOTE — ASSESSMENT & PLAN NOTE
Currently being treated with doxycycline, discussed that ongoing symptoms do not necessarily correlate to untreated infection   Continue with supportive care, instructed patient to follow-up with urology if symptoms persist

## 2022-08-23 NOTE — PROGRESS NOTES
FAMILY MEDICINE PROGRESS NOTE    Date of Service: 22  Primary Care Provider:   Trevon Oliveira MD       Name: Ivan Day       : 1985       Age:36 y o  Sex: male      MRN: [id number]      Chief Complaint:Physical Exam       ASSESSMENT and PLAN:  Ivan Day is a 31 y o  male with:     Problem List Items Addressed This Visit        Genitourinary    Epididymitis     Currently being treated with doxycycline, discussed that ongoing symptoms do not necessarily correlate to untreated infection  Continue with supportive care, instructed patient to follow-up with urology if symptoms persist              Other    Brachioradialis muscle tenderness     Ongoing symptoms from remote injury  No dysfunction  Will obtain US to evaluate further  Relevant Orders    US MSK limited      Other Visit Diagnoses     Muscle strain of left forearm, sequela    -  Primary    Relevant Orders    US MSK limited    Annual physical exam        Relevant Orders    Comprehensive metabolic panel    Lipid panel    CBC and differential    BMI 23 0-23 9, adult        Need for hepatitis C screening test        Relevant Orders    Hepatitis C Antibody ([facility])    Screening for HIV (human immunodeficiency virus)        Relevant Orders    HIV 1/2 Antigen/Antibody (4th Generation) w Reflex [facility]    Pilonidal cyst              SUBJECTIVE:  Ivan Day is a 31 y o  male who presents today with a chief complaint of Physical Exam  HPI     He reports that he thinks he injured his biceps tendon  He felt a pop when working out about two years ago  He reports he now has aching pain the ventral aspect of proximal forearm just distal to antecubital fossa  He reports that he can exercise as long as he warms up before hand  It will feel worse when he exercises without warming up  He takes Aleve every couple days  He is currently being treated for epididymitis, this is making him feel unwell   He is currently finishing a two week course of antibiotics  Overall his symptoms are improving  Review of Systems   Genitourinary: Positive for testicular pain  Negative for dysuria (resolved)  Musculoskeletal: Positive for arthralgias  I have reviewed the patient's Past Medical History  Current Outpatient Medications:     doxycycline monohydrate (MONODOX) 100 mg capsule, Take 1 capsule (100 mg total) by mouth 2 (two) times a day for 14 days, Disp: 28 capsule, Rfl: 0    OBJECTIVE:  /78   Pulse 78   Temp (!) 95 7 °F (35 4 °C)   Resp 17   Ht 5' 9" (1 753 m)   Wt 71 7 kg (158 lb)   SpO2 100%   BMI 23 33 kg/m²    BP Readings from Last 3 Encounters:   08/23/22 124/78   08/05/22 110/82   07/28/22 110/70      Wt Readings from Last 3 Encounters:   08/23/22 71 7 kg (158 lb)   08/05/22 71 7 kg (158 lb)   07/28/22 71 7 kg (158 lb)      Physical Exam  Constitutional:       General: He is not in acute distress  Appearance: Normal appearance  He is not ill-appearing or toxic-appearing  HENT:      Head: Normocephalic and atraumatic  Right Ear: Tympanic membrane, ear canal and external ear normal       Left Ear: Tympanic membrane, ear canal and external ear normal       Nose: Nose normal       Mouth/Throat:      Mouth: Mucous membranes are moist    Eyes:      Extraocular Movements: Extraocular movements intact  Conjunctiva/sclera: Conjunctivae normal    Cardiovascular:      Rate and Rhythm: Normal rate and regular rhythm  Pulses: Normal pulses  Heart sounds: Normal heart sounds  No murmur heard  No gallop  Pulmonary:      Effort: Pulmonary effort is normal  No respiratory distress  Breath sounds: Normal breath sounds  No stridor  No wheezing, rhonchi or rales  Abdominal:      General: There is no distension  Palpations: Abdomen is soft  Tenderness: There is no abdominal tenderness  There is no guarding or rebound  Musculoskeletal:      Left elbow: No swelling   Normal range of motion  No tenderness  Left forearm: Tenderness present  No swelling, edema, deformity, lacerations or bony tenderness  Left wrist: No swelling, deformity, effusion, tenderness or crepitus  Normal range of motion  Arms:       Cervical back: Normal range of motion and neck supple  No rigidity or tenderness  Right lower leg: No edema  Left lower leg: No edema  Comments: Normal strength in left elbow on flexion, extension, supination, pronation  Resisted elbow flexion and supination reproduce symptoms  Lymphadenopathy:      Cervical: No cervical adenopathy  Skin:     General: Skin is warm and dry  Findings: No erythema or rash  Neurological:      General: No focal deficit present  Mental Status: He is alert and oriented to person, place, and time  Psychiatric:         Mood and Affect: Mood normal          Behavior: Behavior normal               Return in about 1 year (around 8/23/2023) for Annual physical     Nabil Mitchell MD    Note: Portions of the record have been created with voice recognition software  Occasional wrong word or "sound a like" substitutions may have occurred due to the inherent limitations of voice recognition software  Read the chart carefully and recognize, using context, where substitutions have occurred

## 2022-08-24 LAB — HIV 1+2 AB+HIV1 P24 AG SERPL QL IA: NORMAL

## 2022-08-26 ENCOUNTER — TELEPHONE (OUTPATIENT)
Dept: FAMILY MEDICINE CLINIC | Facility: CLINIC | Age: 37
End: 2022-08-26

## 2022-08-26 ENCOUNTER — HOSPITAL ENCOUNTER (EMERGENCY)
Facility: HOSPITAL | Age: 37
Discharge: HOME | End: 2022-08-27
Attending: EMERGENCY MEDICINE
Payer: COMMERCIAL

## 2022-08-26 ENCOUNTER — APPOINTMENT (EMERGENCY)
Dept: CT IMAGING | Facility: HOSPITAL | Age: 37
End: 2022-08-26
Payer: COMMERCIAL

## 2022-08-26 VITALS
OXYGEN SATURATION: 100 % | SYSTOLIC BLOOD PRESSURE: 137 MMHG | TEMPERATURE: 98.1 F | RESPIRATION RATE: 18 BRPM | HEART RATE: 66 BPM | DIASTOLIC BLOOD PRESSURE: 75 MMHG

## 2022-08-26 DIAGNOSIS — N45.1 EPIDIDYMITIS: Primary | ICD-10-CM

## 2022-08-26 DIAGNOSIS — N41.0 ACUTE PROSTATITIS: ICD-10-CM

## 2022-08-26 LAB
ALBUMIN SERPL BCP-MCNC: 4.7 G/DL (ref 3.5–5)
ALP SERPL-CCNC: 58 U/L (ref 34–104)
ALT SERPL W P-5'-P-CCNC: 11 U/L (ref 7–52)
ANION GAP SERPL CALCULATED.3IONS-SCNC: 8 MMOL/L (ref 4–13)
AST SERPL W P-5'-P-CCNC: 22 U/L (ref 13–39)
BASOPHILS # BLD AUTO: 0.06 THOUSANDS/ΜL (ref 0–0.1)
BASOPHILS NFR BLD AUTO: 1 % (ref 0–1)
BILIRUB SERPL-MCNC: 1.58 MG/DL (ref 0.2–1)
BILIRUB UR QL STRIP: NEGATIVE
BUN SERPL-MCNC: 18 MG/DL (ref 5–25)
CALCIUM SERPL-MCNC: 9.3 MG/DL (ref 8.4–10.2)
CARDIAC TROPONIN I PNL SERPL HS: <2 NG/L
CHLORIDE SERPL-SCNC: 107 MMOL/L (ref 96–108)
CLARITY UR: CLEAR
CO2 SERPL-SCNC: 26 MMOL/L (ref 21–32)
COLOR UR: COLORLESS
CREAT SERPL-MCNC: 1.01 MG/DL (ref 0.6–1.3)
EOSINOPHIL # BLD AUTO: 0.14 THOUSAND/ΜL (ref 0–0.61)
EOSINOPHIL NFR BLD AUTO: 2 % (ref 0–6)
ERYTHROCYTE [DISTWIDTH] IN BLOOD BY AUTOMATED COUNT: 13.9 % (ref 11.6–15.1)
GFR SERPL CREATININE-BSD FRML MDRD: 96 ML/MIN/1.73SQ M
GLUCOSE SERPL-MCNC: 92 MG/DL (ref 65–140)
GLUCOSE UR STRIP-MCNC: NEGATIVE MG/DL
HCT VFR BLD AUTO: 42.2 % (ref 36.5–49.3)
HGB BLD-MCNC: 14.2 G/DL (ref 12–17)
HGB UR QL STRIP.AUTO: NEGATIVE
IMM GRANULOCYTES # BLD AUTO: 0.02 THOUSAND/UL (ref 0–0.2)
IMM GRANULOCYTES NFR BLD AUTO: 0 % (ref 0–2)
KETONES UR STRIP-MCNC: NEGATIVE MG/DL
LEUKOCYTE ESTERASE UR QL STRIP: NEGATIVE
LYMPHOCYTES # BLD AUTO: 2.1 THOUSANDS/ΜL (ref 0.6–4.47)
LYMPHOCYTES NFR BLD AUTO: 27 % (ref 14–44)
MCH RBC QN AUTO: 29.8 PG (ref 26.8–34.3)
MCHC RBC AUTO-ENTMCNC: 33.6 G/DL (ref 31.4–37.4)
MCV RBC AUTO: 89 FL (ref 82–98)
MONOCYTES # BLD AUTO: 0.67 THOUSAND/ΜL (ref 0.17–1.22)
MONOCYTES NFR BLD AUTO: 9 % (ref 4–12)
NEUTROPHILS # BLD AUTO: 4.76 THOUSANDS/ΜL (ref 1.85–7.62)
NEUTS SEG NFR BLD AUTO: 61 % (ref 43–75)
NITRITE UR QL STRIP: NEGATIVE
NRBC BLD AUTO-RTO: 0 /100 WBCS
PH UR STRIP.AUTO: 5.5 [PH]
PLATELET # BLD AUTO: 219 THOUSANDS/UL (ref 149–390)
PMV BLD AUTO: 9.6 FL (ref 8.9–12.7)
POTASSIUM SERPL-SCNC: 4 MMOL/L (ref 3.5–5.3)
PROT SERPL-MCNC: 7.2 G/DL (ref 6.4–8.4)
PROT UR STRIP-MCNC: NEGATIVE MG/DL
RBC # BLD AUTO: 4.77 MILLION/UL (ref 3.88–5.62)
SODIUM SERPL-SCNC: 141 MMOL/L (ref 135–147)
SP GR UR STRIP.AUTO: 1 (ref 1–1.03)
UROBILINOGEN UR STRIP-ACNC: <2 MG/DL
WBC # BLD AUTO: 7.75 THOUSAND/UL (ref 4.31–10.16)

## 2022-08-26 PROCEDURE — 80053 COMPREHEN METABOLIC PANEL: CPT | Performed by: EMERGENCY MEDICINE

## 2022-08-26 PROCEDURE — 85025 COMPLETE CBC W/AUTO DIFF WBC: CPT | Performed by: EMERGENCY MEDICINE

## 2022-08-26 PROCEDURE — 99284 EMERGENCY DEPT VISIT MOD MDM: CPT

## 2022-08-26 PROCEDURE — 81003 URINALYSIS AUTO W/O SCOPE: CPT | Performed by: EMERGENCY MEDICINE

## 2022-08-26 PROCEDURE — 93005 ELECTROCARDIOGRAM TRACING: CPT

## 2022-08-26 PROCEDURE — 36415 COLL VENOUS BLD VENIPUNCTURE: CPT | Performed by: EMERGENCY MEDICINE

## 2022-08-26 PROCEDURE — 84484 ASSAY OF TROPONIN QUANT: CPT | Performed by: EMERGENCY MEDICINE

## 2022-08-26 PROCEDURE — 99285 EMERGENCY DEPT VISIT HI MDM: CPT | Performed by: EMERGENCY MEDICINE

## 2022-08-26 PROCEDURE — G1004 CDSM NDSC: HCPCS

## 2022-08-26 PROCEDURE — 84443 ASSAY THYROID STIM HORMONE: CPT

## 2022-08-26 PROCEDURE — 74177 CT ABD & PELVIS W/CONTRAST: CPT

## 2022-08-26 PROCEDURE — 86592 SYPHILIS TEST NON-TREP QUAL: CPT

## 2022-08-26 RX ADMIN — IOHEXOL 70 ML: 350 INJECTION, SOLUTION INTRAVENOUS at 23:35

## 2022-08-26 NOTE — Clinical Note
Savannah Haileyirene was seen and treated in our emergency department on 8/26/2022  Diagnosis:     Quita Nageotte  may return to work on return date  He may return on this date: 08/31/2022         If you have any questions or concerns, please don't hesitate to call        Russel Morales MD    ______________________________           _______________          _______________  [facility] Representative                              Date                                Time

## 2022-08-26 NOTE — TELEPHONE ENCOUNTER
Returned call to patient wife  Tomorrow is patients last does of antibiotic ( Doxycycline Monohydrate )    Patient wife reports tenderness, lower pelvic groin pain  Last night he woke in a sweat and was shaking  Did not take temp  Took Advil and went back to bed  Patients wife states that she can not report on swelling , fever or additional symptoms , he is in a meeting and can not receive calls  Patient wife texted  and he responded via text with symptoms  Patient states he is having some swelling , yesterday was worse than today  Area is tender to touch   Has a cyst Pilonidal cyst   , intermittent pain, burning with urination  Urine is clear to yellow  Loose stools  No reported nausea or vomiting  Some chills reported  Sometimes nose is running   Reports increased fatigue and night sweats, heart palpitations  Advised patient wife to contact pcp with newly reported symptoms  Reviewed supportive measures scrotal support, otc ice, warm compress     Will send message to provider for recommendations

## 2022-08-26 NOTE — TELEPHONE ENCOUNTER
Patient's wife called stating patient still having a lot of pain and discomfort   The antibiotics is not helping him at all  Patient is at work on a training session and would not be able to answer the phone  She would like to know if she can be contacted instead to see if patient can be seen         She can be contacted at [phone]

## 2022-08-26 NOTE — TELEPHONE ENCOUNTER
Pt seen at urgent care then urology  Dx of epididymitis and was given rounds of antibiotics  Swelling has decreased but not gone  Ava called urology today but was told to call us for this issue  Please advise

## 2022-08-26 NOTE — TELEPHONE ENCOUNTER
Please call urology to discuss this issue and find out why they would not address the urologic issue for which they were seeing the patient

## 2022-08-26 NOTE — TELEPHONE ENCOUNTER
Reviewed  I did speak extensively about this with patient the other day, explained that just because he is still having symptoms does not mean he has not been treated, it can sometimes take time for symptoms to resolved  Recommended elevation of the testicles when possible  His labs were reassuring as well the ultrasound  Can you please reiterate this to the patient as well

## 2022-08-26 NOTE — TELEPHONE ENCOUNTER
Spoke with patient's spouse over the phone  Recommended ER evaluation due to acute persistent symptoms  Patient's wife in agreement and they will go after work hours

## 2022-08-26 NOTE — TELEPHONE ENCOUNTER
Dr Benji Harrell office calling regarding dx and ongoing symptoms  Wife called urology office and was referred back to PCP  Please call pt to advise

## 2022-08-26 NOTE — TELEPHONE ENCOUNTER
On a different encounter, ZAYDA contacted spouse and recommended the following:    Spoke with patient's spouse over the phone  Recommended ER evaluation due to acute persistent symptoms  Patient's wife in agreement and they will go after work hours

## 2022-08-27 LAB
RPR SER QL: NORMAL
TSH SERPL DL<=0.05 MIU/L-ACNC: 2.42 UIU/ML (ref 0.45–4.5)

## 2022-08-27 RX ORDER — DOXYCYCLINE 100 MG/1
100 CAPSULE ORAL 2 TIMES DAILY
Qty: 28 CAPSULE | Refills: 0 | Status: SHIPPED | OUTPATIENT
Start: 2022-08-27 | End: 2022-09-10

## 2022-08-27 NOTE — ED PROVIDER NOTES
History  Chief Complaint   Patient presents with    Possible UTI     Pt reports being treated for UTI approx 5 weeks ago with 2 diff abx, pt reports last night woke up with fever/chills/palpitations, pt reports still burning with urination     Dariela Sanches comes to the ED after persistence of burning sensation with both urination and ejaculation  He states that he has been continually evaluated by 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  He also states that he has a new sexual contact  His workup at this time has included multiple laboratory studies, ultrasound for testicular torsion, semen analysis, STI testing for GC/chlamydia and urinalysis  After review of the electronic medical record and discussion with the patient at the bedside, it was determined that the patient did have a working diagnosis of epididymitis and was counseled by Urology that the duration of his symptoms may persist even despite sufficient treatment of potential infectious etiologies  Patient denies any abdominal pain, nausea, vomiting, chest pains, shortness of breath, changes in vision, changes in hearing, increased lower extremity swelling, blood in his urination, changes in his bowel movements, or loss of consciousness        History provided by:  Patient   used: No    Penis / Scrotum Problem  Presenting symptoms: scrotal pain    Presenting symptoms: no dysuria, no penile discharge and no swelling    Context: during intercourse and during urination    Relieved by:  Nothing  Worsened by:  Nothing  Ineffective treatments:  Prescription drugs  Associated symptoms: no abdominal pain, no diarrhea, no fever, no flank pain, no genital itching, no genital lesions, no genital rash, no groin pain, no hematuria, no nausea, no penile redness, no penile swelling, no priapism, no scrotal swelling, no urinary frequency, no urinary hesitation, no urinary incontinence, no urinary retention and no vomiting        Prior to Admission Medications   Prescriptions Last Dose Informant Patient Reported? Taking?   doxycycline monohydrate (MONODOX) 100 mg capsule  Self No No   Sig: Take 1 capsule (100 mg total) by mouth 2 (two) times a day for 14 days   doxycycline monohydrate (MONODOX) 100 mg capsule   No Yes   Sig: Take 1 capsule (100 mg total) by mouth 2 (two) times a day for 14 days      Facility-Administered Medications: None       Past Medical History:   Diagnosis Date    Epididymitis 2022       Past Surgical History:   Procedure Laterality Date    HERNIA REPAIR  0    8 months old       Family History   Problem Relation Age of Onset    Urolithiasis Mother         on and off    No Known Problems Father     Cancer Maternal Grandmother         lung cancer/    [facility] Cancer Paternal Grandfather         Prostate cancer/     Diabetes Paternal Grandfather         diagnosed in his 45s     I have reviewed and agree with the history as documented  E-Cigarette/Vaping    E-Cigarette Use Never User      E-Cigarette/Vaping Substances     Social History     Tobacco Use    Smoking status: Never Smoker    Smokeless tobacco: Never Used    Tobacco comment: N/A   Vaping Use    Vaping Use: Never used   Substance Use Topics    Alcohol use: Not Currently     Comment: less than 2 drinks a month    Drug use: No        Review of Systems   Constitutional: Negative for chills and fever  HENT: Negative for ear pain and sore throat      Eyes: Negative for pain and visual disturbance  Respiratory: Negative for cough and shortness of breath  Cardiovascular: Negative for chest pain and palpitations  Gastrointestinal: Negative for abdominal pain, diarrhea, nausea and vomiting  Genitourinary: Negative for bladder incontinence, dysuria, flank pain, frequency, hematuria, hesitancy, penile discharge, penile swelling and scrotal swelling  Musculoskeletal: Negative for arthralgias and back pain  Skin: Negative for color change and rash  Neurological: Negative for seizures and syncope  All other systems reviewed and are negative  Physical Exam  ED Triage Vitals [08/26/22 1926]   Temperature Pulse Respirations Blood Pressure SpO2   98 1 °F (36 7 °C) 66 18 136/86 100 %      Temp Source Heart Rate Source Patient Position - Orthostatic VS BP Location FiO2 (%)   Oral Monitor Sitting Left arm --      Pain Score       --             Orthostatic Vital Signs  Vitals:    08/26/22 1926 08/26/22 2249   BP: 136/86 137/75   Pulse: 66 66   Patient Position - Orthostatic VS: Sitting Sitting       Physical Exam  Vitals and nursing note reviewed  Constitutional:       Appearance: Normal appearance  He is well-developed  HENT:      Head: Normocephalic and atraumatic  Comments: Patient states that he recently removed a tick from the posterior scalp  Scabbed appreciated over the area but upon close inspection no presence of arthropod head or remaining components  Right Ear: External ear normal       Left Ear: External ear normal       Nose: Nose normal       Mouth/Throat:      Mouth: Mucous membranes are moist    Eyes:      Conjunctiva/sclera: Conjunctivae normal    Cardiovascular:      Rate and Rhythm: Normal rate and regular rhythm  Pulses: Normal pulses  Heart sounds: Normal heart sounds  No murmur heard  Pulmonary:      Effort: Pulmonary effort is normal  No respiratory distress  Breath sounds: Normal breath sounds     Abdominal: Palpations: Abdomen is soft  Tenderness: There is no abdominal tenderness  There is no guarding or rebound  Genitourinary:     Testes: Normal       Comments: No tenderness on palpation of testes  Cremasteric reflex intact bilaterally  No redness or erythema appreciated on examination of the genitals  Musculoskeletal:      Cervical back: Normal range of motion and neck supple  Skin:     General: Skin is warm and dry  Capillary Refill: Capillary refill takes less than 2 seconds  Neurological:      General: No focal deficit present  Mental Status: He is alert  Psychiatric:         Mood and Affect: Mood normal          ED Medications  Medications   iohexol (OMNIPAQUE) 350 MG/ML injection (MULTI-DOSE) 70 mL (70 mL Intravenous Given 8/26/22 2335)       Diagnostic Studies  Results Reviewed     Procedure Component Value Units Date/Time    TSH, 3rd generation with Free T4 reflex [[id number]]  (Normal) Collected: 08/26/22 2247    Lab Status: Final result Specimen: Blood from Arm, Left Updated: 08/27/22 0035     TSH 3RD GENERATON 2 424 uIU/mL     Narrative:      Patients undergoing fluorescein dye angiography may retain small amounts of fluorescein in the body for 48-72 hours post procedure  Samples containing fluorescein can produce falsely depressed TSH values  If the patient had this procedure,a specimen should be resubmitted post fluorescein clearance  RPR [[id number]] Collected: 08/26/22 2347    Lab Status:  In process Specimen: Blood from Arm, Left Updated: 08/26/22 2352    HS Troponin 0hr (reflex protocol) [[id number]]  (Normal) Collected: 08/26/22 2247    Lab Status: Final result Specimen: Blood from Arm, Left Updated: 08/26/22 2332     hs TnI 0hr <2 ng/L     Comprehensive metabolic panel [[id number]]  (Abnormal) Collected: 08/26/22 2247    Lab Status: Final result Specimen: Blood from Arm, Left Updated: 08/26/22 2318     Sodium 141 mmol/L      Potassium 4 0 mmol/L      Chloride 107 mmol/L CO2 26 mmol/L      ANION GAP 8 mmol/L      BUN 18 mg/dL      Creatinine 1 01 mg/dL      Glucose 92 mg/dL      Calcium 9 3 mg/dL      AST 22 U/L      ALT 11 U/L      Alkaline Phosphatase 58 U/L      Total Protein 7 2 g/dL      Albumin 4 7 g/dL      Total Bilirubin 1 58 mg/dL      eGFR 96 ml/min/1 73sq m     Narrative:      National Kidney Disease Foundation guidelines for Chronic Kidney Disease (CKD):     Stage 1 with normal or high GFR (GFR > 90 mL/min/1 73 square meters)    Stage 2 Mild CKD (GFR = 60-89 mL/min/1 73 square meters)    Stage 3A Moderate CKD (GFR = 45-59 mL/min/1 73 square meters)    Stage 3B Moderate CKD (GFR = 30-44 mL/min/1 73 square meters)    Stage 4 Severe CKD (GFR = 15-29 mL/min/1 73 square meters)    Stage 5 End Stage CKD (GFR <15 mL/min/1 73 square meters)  Note: GFR calculation is accurate only with a steady state creatinine    CBC and differential [[id number]] Collected: 08/26/22 2247    Lab Status: Final result Specimen: Blood from Arm, Left Updated: 08/26/22 2306     WBC 7 75 Thousand/uL      RBC 4 77 Million/uL      Hemoglobin 14 2 g/dL      Hematocrit 42 2 %      MCV 89 fL      MCH 29 8 pg      MCHC 33 6 g/dL      RDW 13 9 %      MPV 9 6 fL      Platelets 470 Thousands/uL      nRBC 0 /100 WBCs      Neutrophils Relative 61 %      Immat GRANS % 0 %      Lymphocytes Relative 27 %      Monocytes Relative 9 %      Eosinophils Relative 2 %      Basophils Relative 1 %      Neutrophils Absolute 4 76 Thousands/µL      Immature Grans Absolute 0 02 Thousand/uL      Lymphocytes Absolute 2 10 Thousands/µL      Monocytes Absolute 0 67 Thousand/µL      Eosinophils Absolute 0 14 Thousand/µL      Basophils Absolute 0 06 Thousands/µL     UA w Reflex to Microscopic w Reflex to Culture [[id number]] Collected: 08/26/22 1958    Lab Status: Final result Specimen: Urine, Clean Catch Updated: 08/26/22 2010     Color, UA Colorless     Clarity, UA Clear     Specific Gravity, UA 1 004     pH, UA 5 5 Leukocytes, UA Negative     Nitrite, UA Negative     Protein, UA Negative mg/dl      Glucose, UA Negative mg/dl      Ketones, UA Negative mg/dl      Urobilinogen, UA <2 0 mg/dl      Bilirubin, UA Negative     Occult Blood, UA Negative                 CT abdomen pelvis with contrast   ED Interpretation by Dexter Trujillo MD (08/27 0023)   FINDINGS:     ABDOMEN     LOWER CHEST:  No clinically significant abnormality identified in the visualized lower chest      LIVER/BILIARY TREE:  Unremarkable      GALLBLADDER:  No calcified gallstones  No pericholecystic inflammatory change      SPLEEN:  Unremarkable      PANCREAS:  Unremarkable      ADRENAL GLANDS:  Unremarkable      KIDNEYS/URETERS:  No hydronephrosis or urinary tract calculus  One or more sharply circumscribed subcentimeter renal hypodensities are present, too small to accurately characterize, and statistically most likely benign findings  According to recent   literature (Radiology 2019) no further workup of these findings is recommended      STOMACH AND BOWEL:  Fecal material within the colon      APPENDIX:  Not visualized      ABDOMINOPELVIC CAVITY:  No ascites  No pneumoperitoneum  No lymphadenopathy      VESSELS:  Unremarkable for patient's age      PELVIS     REPRODUCTIVE ORGANS:  Unremarkable for patient's age      URINARY BLADDER:  Unremarkable      ABDO   ROSHNI WALL/INGUINAL REGIONS:  Unremarkable      OSSEOUS STRUCTURES:  No acute fracture or destructive osseous lesion      IMPRESSION:     No evidence of hydronephrosis or urinary tract calculi      Large amount fecal material within the colon suggestive of constipation      Appendix is not visualized cannot rule out acute appendicitis           Workstation performed: [id number]      Final Result by Eloy Molina DO (08/27 0014)      No evidence of hydronephrosis or urinary tract calculi  Large amount fecal material within the colon suggestive of constipation        Appendix is not visualized cannot rule out acute appendicitis  Workstation performed: [id number]               Procedures  ECG 12 Lead Documentation Only    Date/Time: 8/27/2022 7:36 AM  Performed by: Yvonne Almanza MD  Authorized by: Yvonne Almanza MD     Patient location:  ED  Previous ECG:     Previous ECG:  Unavailable    Comparison to cardiac monitor: No    Interpretation:     Interpretation: normal    Quality:     Tracing quality:  Limited by artifact  Rate:     ECG rate:  66    ECG rate assessment: normal    Rhythm:     Rhythm: sinus rhythm    Ectopy:     Ectopy: none    QRS:     QRS axis:  Normal    QRS intervals:  Normal  Conduction:     Conduction: normal    ST segments:     ST segments:  Normal  T waves:     T waves: normal            ED Course                             SBIRT 22yo+    Flowsheet Row Most Recent Value   SBIRT (23 yo +)    In order to provide better care to our patients, we are screening all of our patients for alcohol and drug use  Would it be okay to ask you these screening questions? Unable to answer at this time Filed at: 08/26/2022 1928                MDM  Number of Diagnoses or Management Options  Epididymitis: established and improving  Diagnosis management comments: Alicia Campos comes emergency department after experiencing persistence of genital symptoms consistent with prior diagnosis of epididymitis that has not improved patient's satisfaction despite utilization of multiple antibiotic courses  Based off initial presentation and description of complaints, initial laboratory studies were conducted  Laboratory evaluation and urinalysis was unremarkable  Based off of persistent complaints and expressed concerns of the patient and family at the bedside, CT of the abdomen and pelvis was conducted for evaluation of any potential renal pathology or acute abdominal process  CT of the abdomen and pelvis was unremarkable      ECG tracing was unremarkable in demonstrated sinus rhythm with no acute ischemic changes  No arrhythmias appreciated  Patient expressed concern that he wanted blood cultures in semen cultures secondary to literature review that he conducted on his own at home  After discussing the role of the emergency department as well as the inability to follow these cultures in the long-term setting and the expressed concern that he would be most beneficial a served by continuing discussion and evaluation with his urology team, patient was agreeable with deferring cultures of these samples at this time  Secondary to his complaints as well, RPR was sent with regards to broadening the evaluation of potential STIs as well as thyroid evaluation  Further counseling was performed at the bedside with regards to the patient's frustration with the lack of improvement in his symptoms  In conjunction and in the setting of the previous evaluation by his Urology team who also expressed in their previous notes that his symptoms may persist for a longer period of time than what he was expecting, it was encouraged that the patient may have persistent symptoms given the potential etiology of his epididymitis  After prolonged conversation with family and the patient, it was agreed upon that he would be provided with a prescription to continue his doxycycline regiment but should follow up with his urology team as soon as possible for continued evaluation of his symptoms as well as medication management  Patient expressed understanding with this plan  Patient was also counseled on the setting of prolonged and multiple courses of antibiotics, it would not be unreasonable to utilize probiotics for gut health  Based off stability of vitals, no acute findings on laboratory evaluations, and prior workup up to this point in the emergency department, it was deemed that the patient would be stable for discharge home      Disposition:  Discharged home with close urology follow-up for continued management of symptoms  Amount and/or Complexity of Data Reviewed  Clinical lab tests: ordered and reviewed  Tests in the radiology section of CPT®: ordered and reviewed  Obtain history from someone other than the patient: yes  Review and summarize past medical records: yes  Discuss the patient with other providers: yes  Independent visualization of images, tracings, or specimens: yes    Risk of Complications, Morbidity, and/or Mortality  Presenting problems: low  Diagnostic procedures: low  Management options: low    Patient Progress  Patient progress: stable      Disposition  Final diagnoses:   Epididymitis     Time reflects when diagnosis was documented in both MDM as applicable and the Disposition within this note     Time User Action Codes Description Comment    8/27/2022 12:38 AM Stefania Flax Add [N41 0] Acute prostatitis     8/27/2022 12:38 AM Stefania Flax Add [N45 1] Epididymitis     8/27/2022 12:40 AM Stefania Flax Modify [N45 1] Epididymitis       ED Disposition     ED Disposition   Discharge    Condition   Stable    Date/Time   Sat Aug 27, 2022 12:38 AM    Comment   Priti Jeffery discharge to home/self care  Follow-up Information     Follow up With Specialties Details Why Contact Info Additional [id number], MD Family Medicine   [address] [place] [address] Emergency Department Emergency Medicine  As needed, If symptoms worsen [address] [facility] [id number] [place] [address] Emergency Department, [address], [place], [place], [address] [facility] Schedule an appointment as soon as possible for a visit  For continued evaluation of symptoms, discussion on continued antibiotic usage, continued evaluation of potential epididymitis versus other renal pathology  Lamont Tran [address] [facility] [place], [address] [place] [address], [address]          Discharge Medication List as of 8/27/2022 12:41 AM      CONTINUE these medications which have CHANGED    Details   doxycycline monohydrate (MONODOX) 100 mg capsule Take 1 capsule (100 mg total) by mouth 2 (two) times a day for 14 days, Starting Sat 8/27/2022, Until Sat 9/10/2022, Normal           No discharge procedures on file  PDMP Review     None           ED Provider  Attending physically available and evaluated Lou Deni SPANN managed the patient along with the ED Attending      Electronically Signed by         Ginette Boyd MD  08/27/22 1560

## 2022-08-27 NOTE — ED ATTENDING ATTESTATION
8/26/2022  I, Suzanne Ponce MD, saw and evaluated the patient  I have discussed the patient with the resident/non-physician practitioner and agree with the resident's/non-physician practitioner's findings, Plan of Care, and MDM as documented in the resident's/non-physician practitioner's note, except where noted  All available labs and Radiology studies were reviewed  I was present for key portions of any procedure(s) performed by the resident/non-physician practitioner and I was immediately available to provide assistance  At this point I agree with the current assessment done in the Emergency Department  I have conducted an independent evaluation of this patient a history and physical is as follows:    ED Course         Critical Care Time  Procedures      Patient is a 31 yom who presents with dysuria, pain with ejaculation  Notes some continued pain  No f/c/s  No vomiting or diarrhea  Normal cremasteric reflex  MDM pleasant 31 yom, dysuria and dysorgasmia, no concerning findings on uti, will check basic labs, add syphilis, followup urology

## 2022-08-27 NOTE — DISCHARGE INSTRUCTIONS
Please continue to utilize current antibiotic therapy for continued/potential improvement in symptoms  It is important that you follow-up with Urology team for continued evaluation of your presumed epididymitis  Pain management can also be achieved with utilization of anti-inflammatory medications and Tylenol usage as needed at home  Please return to the emergency department if you experience worsening of symptoms that include fever, chills, nausea, vomiting, loss of consciousness, changes in vision, changes in hearing, inability to urinate, increased penile discharge, rectal pain, rectal bleeding, or changes in your urination or bowel movements

## 2022-08-29 NOTE — TELEPHONE ENCOUNTER
Pt under care of: Shanell Park     Last Seen: 8/5/2022    Reason for call: ED follow up from 8/26/2022 for Epididymitis     "For continued evaluation of symptoms, discussion on continued antibiotic usage, continued evaluation of potential epididymitis versus other renal pathology "    Patient had CT abdomen pelvis w contrast done 8/26/2022 in [facility]      Pt can be reached at: Patient's ex wife called in, she is requesting he follow up only with a MD Louisa Sams call back is [phone]

## 2022-08-29 NOTE — TELEPHONE ENCOUNTER
Spoke with Edna Riding with [facility]ino on another line and he was addiment about seeing a doctor only -   scheduled with Dr Domingo Thrasher on 9/8 @ 3:30

## 2022-08-30 LAB
ATRIAL RATE: 66 BPM
P AXIS: 63 DEGREES
PR INTERVAL: 162 MS
QRS AXIS: 56 DEGREES
QRSD INTERVAL: 106 MS
QT INTERVAL: 396 MS
QTC INTERVAL: 415 MS
T WAVE AXIS: 32 DEGREES
VENTRICULAR RATE: 66 BPM

## 2022-08-30 PROCEDURE — 93010 ELECTROCARDIOGRAM REPORT: CPT | Performed by: INTERNAL MEDICINE

## 2022-09-08 ENCOUNTER — APPOINTMENT (OUTPATIENT)
Dept: LAB | Facility: CLINIC | Age: 37
End: 2022-09-08
Payer: COMMERCIAL

## 2022-09-08 ENCOUNTER — OFFICE VISIT (OUTPATIENT)
Dept: UROLOGY | Facility: AMBULATORY SURGERY CENTER | Age: 37
End: 2022-09-08
Payer: COMMERCIAL

## 2022-09-08 ENCOUNTER — TELEPHONE (OUTPATIENT)
Dept: OTHER | Facility: OTHER | Age: 37
End: 2022-09-08

## 2022-09-08 VITALS
OXYGEN SATURATION: 97 % | HEIGHT: 69 IN | WEIGHT: 157 LBS | DIASTOLIC BLOOD PRESSURE: 80 MMHG | BODY MASS INDEX: 23.25 KG/M2 | SYSTOLIC BLOOD PRESSURE: 118 MMHG | HEART RATE: 79 BPM

## 2022-09-08 DIAGNOSIS — N41.0 ACUTE PROSTATITIS: ICD-10-CM

## 2022-09-08 DIAGNOSIS — N41.0 ACUTE PROSTATITIS: Primary | ICD-10-CM

## 2022-09-08 LAB
BACTERIA UR QL AUTO: NORMAL /HPF
BILIRUB UR QL STRIP: NEGATIVE
CLARITY UR: CLEAR
COLOR UR: COLORLESS
CRP SERPL QL: <1 MG/L
GLUCOSE UR STRIP-MCNC: NEGATIVE MG/DL
HGB UR QL STRIP.AUTO: NEGATIVE
KETONES UR STRIP-MCNC: NEGATIVE MG/DL
LEUKOCYTE ESTERASE UR QL STRIP: NEGATIVE
NITRITE UR QL STRIP: NEGATIVE
NON-SQ EPI CELLS URNS QL MICRO: NORMAL /HPF
PH UR STRIP.AUTO: 7 [PH]
PROT UR STRIP-MCNC: NEGATIVE MG/DL
RBC #/AREA URNS AUTO: NORMAL /HPF
SP GR UR STRIP.AUTO: 1.01 (ref 1–1.03)
UROBILINOGEN UR STRIP-ACNC: <2 MG/DL
WBC #/AREA URNS AUTO: NORMAL /HPF

## 2022-09-08 PROCEDURE — 81001 URINALYSIS AUTO W/SCOPE: CPT | Performed by: UROLOGY

## 2022-09-08 PROCEDURE — 86140 C-REACTIVE PROTEIN: CPT

## 2022-09-08 PROCEDURE — 87086 URINE CULTURE/COLONY COUNT: CPT

## 2022-09-08 PROCEDURE — 36415 COLL VENOUS BLD VENIPUNCTURE: CPT

## 2022-09-08 PROCEDURE — 99214 OFFICE O/P EST MOD 30 MIN: CPT | Performed by: UROLOGY

## 2022-09-08 RX ORDER — DOXYCYCLINE HYCLATE 100 MG/1
100 TABLET, DELAYED RELEASE ORAL 2 TIMES DAILY
Qty: 28 TABLET | Refills: 0 | Status: SHIPPED | OUTPATIENT
Start: 2022-09-08 | End: 2022-09-08

## 2022-09-08 RX ORDER — CEFDINIR 300 MG/1
300 CAPSULE ORAL EVERY 12 HOURS SCHEDULED
Qty: 28 CAPSULE | Refills: 0 | Status: SHIPPED | OUTPATIENT
Start: 2022-09-08 | End: 2022-09-22

## 2022-09-08 RX ORDER — CEFDINIR 300 MG/1
300 CAPSULE ORAL EVERY 12 HOURS SCHEDULED
Qty: 20 CAPSULE | Refills: 0 | Status: SHIPPED | OUTPATIENT
Start: 2022-09-08 | End: 2022-09-08 | Stop reason: SDUPTHER

## 2022-09-08 NOTE — PROGRESS NOTES
Assessment/Plan:    Acute prostatitis  It is unclear what the source of the patient's pain is since his lab work has overall been unremarkable but most likely he has prostatitis and or chronic pelvic pain syndrome  At his request will repeat cultures of the urine and semen  I would also obtain a CRP to get a better sense of the degree of inflammation present if any  The long discussion regarding antibiotics which he wishes to continue  He thinks he has had some progress and doxycycline but has plateaued  We therefore will try cefdinir  We will see him back in a few months to reassess  If symptoms persist we should consider referral to Infectious Disease  Subjective:      Patient ID: Mark iRchey is a 31 y o  male  HPI  31 y o  male  with history of what has become chronic testicular pain assumed to be epididymitis  He was evaluated at an urgent care center on 07/28/2022 for symptoms of dysuria and testicular discomfort  Patient states he engaged in sexual activity a few weeks prior as well as mountain biking for 3 consecutive days  He notes mild redness and swelling  Patient was presumed to have epididymitis and was prescribed a 7 day course of Levaquin  Urine culture and GC chlamydia testing were negative  He was seen in our clinic August 5, 2022 and continued to report symptoms of testicular pressure, discomfort and eden rectal area and feeling of burning sensation  Patient has since had sexual activity with same partner  He denies any pain with ejaculation or hematospermia  Patient denies any additional lower urinary tract symptoms or gross hematuria  We obtained a semen culture (coag neg staph only) and urine culture which was negative  Scrotal ultrasound was overall unremarkable with a mild left varicocele  The patient then presented to the emergency room on August 26th for persistent symptoms of a burning sensation with urination and ejaculation    At that time he was finish a course of doxycycline  CT scan was obtained and was unremarkable other than constipation  Blood work including RPR, CBC, UA and CMP were unremarkable    He thinks that the doxycycline overall helped his pain some but that improvement plateaued and he is still having issues  He now thinks his pain is more cyst localized to the prostate  He is very frustrated and upset about his course  He denies any prior urologic history, surgical intervention, or instrumentation  Patient denies any prior history of undescended testicle or testicular torsion  He states his grandfather had a history of prostate cancer  Past Surgical History:   Procedure Laterality Date    HERNIA REPAIR  2    8 months old        Past Medical History:   Diagnosis Date    Epididymitis 07/28/2022             Review of Systems   Constitutional: Negative for chills and fever  HENT: Negative for ear pain and sore throat  Eyes: Negative for pain and visual disturbance  Respiratory: Negative for cough and shortness of breath  Cardiovascular: Negative for chest pain and palpitations  Gastrointestinal: Negative for abdominal pain and vomiting  Genitourinary: Positive for dysuria and testicular pain  Negative for hematuria  Musculoskeletal: Negative for arthralgias and back pain  Skin: Negative for color change and rash  Neurological: Negative for seizures and syncope  All other systems reviewed and are negative  Objective:      /80   Pulse 79   Ht 5' 9" (1 753 m)   Wt 71 2 kg (157 lb)   SpO2 97%   BMI 23 18 kg/m²     No results found for: PSA       Physical Exam  Constitutional:       General: He is not in acute distress  Appearance: Normal appearance  He is not toxic-appearing  HENT:      Head: Normocephalic and atraumatic  Eyes:      Extraocular Movements: Extraocular movements intact        Conjunctiva/sclera: Conjunctivae normal       Pupils: Pupils are equal, round, and reactive to light  Cardiovascular:      Rate and Rhythm: Normal rate  Pulses: Normal pulses  Pulmonary:      Effort: Pulmonary effort is normal    Abdominal:      General: Abdomen is flat  Palpations: Abdomen is soft  Tenderness: There is no abdominal tenderness  There is no right CVA tenderness, left CVA tenderness, guarding or rebound  Genitourinary:     Penis: Normal        Comments: There is mild tenderness to palpation of the prostate  it does feel somewhat boggy  Testicular exam unremarkable  Skin:     General: Skin is warm and dry  Neurological:      Mental Status: He is alert and oriented to person, place, and time  Mental status is at baseline  Psychiatric:         Mood and Affect: Mood normal          Behavior: Behavior normal          Thought Content: Thought content normal          Judgment: Judgment normal            FINDINGS:       ABDOMEN       LOWER CHEST:  No clinically significant abnormality identified in the visualized lower chest        LIVER/BILIARY TREE:  Unremarkable        GALLBLADDER:  No calcified gallstones  No pericholecystic inflammatory change        SPLEEN:  Unremarkable        PANCREAS:  Unremarkable        ADRENAL GLANDS:  Unremarkable        KIDNEYS/URETERS:  No hydronephrosis or urinary tract calculus   One or more sharply circumscribed subcentimeter renal hypodensities are present, too small to accurately characterize, and statistically most likely benign findings   According to recent   literature (Radiology 2019) no further workup of these findings is recommended        STOMACH AND BOWEL:  Fecal material within the colon        APPENDIX:  Not visualized        ABDOMINOPELVIC CAVITY:  No ascites   No pneumoperitoneum   No lymphadenopathy        VESSELS:  Unremarkable for patient's age        PELVIS       REPRODUCTIVE ORGANS:  Unremarkable for patient's age        URINARY BLADDER:  Unremarkable        ABDO   ROSHNI WALL/INGUINAL REGIONS:  Unremarkable        OSSEOUS STRUCTURES:  No acute fracture or destructive osseous lesion        IMPRESSION:       No evidence of hydronephrosis or urinary tract calculi        Large amount fecal material within the colon suggestive of constipation        Appendix is not visualized cannot rule out acute appendicitis          Orders  Orders Placed This Encounter   Procedures    Semen culture     Standing Status:   Future     Number of Occurrences:   1     Standing Expiration Date:   9/8/2023     Order Specific Question:   Release to patient through Mychart     Answer:   Immediate    Urine culture     Standing Status:   Future     Number of Occurrences:   1     Standing Expiration Date:   9/8/2023     Order Specific Question:   Release to patient through Mychart     Answer:   Immediate    Urinalysis with microscopic    C-reactive protein     Standing Status:   Future     Number of Occurrences:   1     Standing Expiration Date:   9/8/2023

## 2022-09-08 NOTE — TELEPHONE ENCOUNTER
[facility] calling in stating that the patient's cefdinir was ordered q12h for 14 days but only filled for a quantity of 20   Please resend with appropriate quantity of 28

## 2022-09-09 ENCOUNTER — APPOINTMENT (OUTPATIENT)
Dept: LAB | Facility: CLINIC | Age: 37
End: 2022-09-09
Payer: COMMERCIAL

## 2022-09-09 ENCOUNTER — TELEPHONE (OUTPATIENT)
Dept: UROLOGY | Facility: AMBULATORY SURGERY CENTER | Age: 37
End: 2022-09-09

## 2022-09-09 DIAGNOSIS — N41.0 ACUTE PROSTATITIS: ICD-10-CM

## 2022-09-09 LAB — BACTERIA UR CULT: NORMAL

## 2022-09-09 PROCEDURE — 87077 CULTURE AEROBIC IDENTIFY: CPT

## 2022-09-09 PROCEDURE — 87070 CULTURE OTHR SPECIMN AEROBIC: CPT

## 2022-09-09 PROCEDURE — 87186 SC STD MICRODIL/AGAR DIL: CPT

## 2022-09-09 NOTE — TELEPHONE ENCOUNTER
----- Message from Mike Monroe MD sent at 9/9/2022  7:34 AM EDT -----  Please let the patient know his CRP (which is a very sensitive blood test marker for inflammation/infection) is normal

## 2022-09-11 ENCOUNTER — TELEPHONE (OUTPATIENT)
Dept: OTHER | Facility: OTHER | Age: 37
End: 2022-09-11

## 2022-09-11 LAB — BACTERIA SMN CULT: ABNORMAL

## 2022-09-12 PROBLEM — N41.0 ACUTE PROSTATITIS: Status: ACTIVE | Noted: 2022-09-12

## 2022-09-12 RX ORDER — SULFAMETHOXAZOLE AND TRIMETHOPRIM 800; 160 MG/1; MG/1
1 TABLET ORAL EVERY 12 HOURS SCHEDULED
Qty: 28 TABLET | Refills: 0 | Status: SHIPPED | OUTPATIENT
Start: 2022-09-12 | End: 2022-09-20 | Stop reason: SDUPTHER

## 2022-09-12 NOTE — TELEPHONE ENCOUNTER
Pt called, requesting a call back from office at best convenience to review test results   Please assist

## 2022-09-12 NOTE — ASSESSMENT & PLAN NOTE
It is unclear what the source of the patient's pain is since his lab work has overall been unremarkable but most likely he has prostatitis and or chronic pelvic pain syndrome  At his request will repeat cultures of the urine and semen  I would also obtain a CRP to get a better sense of the degree of inflammation present if any  The long discussion regarding antibiotics which he wishes to continue  He thinks he has had some progress and doxycycline but has plateaued  We therefore will try cefdinir  We will see him back in a few months to reassess  If symptoms persist we should consider referral to Infectious Disease

## 2022-09-12 NOTE — TELEPHONE ENCOUNTER
Please let the patient know his CRP (which is a very sensitive blood test marker for inflammation/infection) is normal  Notified [place] that he is to take ABX that Dr Toyin Xiong sent to pharmacy for semen culture   He will call back if she has problems Yes

## 2022-09-19 ENCOUNTER — TELEPHONE (OUTPATIENT)
Dept: OTHER | Facility: OTHER | Age: 37
End: 2022-09-19

## 2022-09-19 DIAGNOSIS — N41.0 ACUTE PROSTATITIS: Primary | ICD-10-CM

## 2022-09-19 NOTE — TELEPHONE ENCOUNTER
Patient called asking to please verify that th antibiotic is the right one for his current UTI  Patient is also looking to get a culture done again to see if the medication is in fact working  Patient would also like to get an extension on the medication  Provider mentioned to the patient the potential for seeing infectious disease provider and would like to get more information on that   He is asking if he needs a referral       Kindly call patient back at your earliest convenience

## 2022-09-19 NOTE — TELEPHONE ENCOUNTER
Pt called the office again sated that he called early this morning before 8 am and was told that someone will call him back but he did not receive a call back yet       Please review   Pt can be reached at [phone]

## 2022-09-20 RX ORDER — SULFAMETHOXAZOLE AND TRIMETHOPRIM 800; 160 MG/1; MG/1
1 TABLET ORAL EVERY 12 HOURS SCHEDULED
Qty: 28 TABLET | Refills: 0 | Status: SHIPPED | OUTPATIENT
Start: 2022-09-20 | End: 2022-10-04

## 2022-09-20 NOTE — ADDENDUM NOTE
Addended by: Salud Berrios on: 9/20/2022 01:23 PM     Modules accepted: Orders Expected Date Of Service: 06/23/2020

## 2022-09-20 NOTE — TELEPHONE ENCOUNTER
Patient calling back to follow up on his call about getting an antibiotic and referral for infectious disease  Informed patient that antibiotic was sent to Pharmacy and referral was placed  He is wanting to know if another semen culture should get done      Please advise    He is requesting a call back at [phone]

## 2022-09-20 NOTE — TELEPHONE ENCOUNTER
Will send course of Bactrim based on last semen culture results    However, recommend referral to infectious disease, as previously advised by Dr Olesya Duncan should his symptoms persist

## 2022-09-20 NOTE — TELEPHONE ENCOUNTER
Patient is requesting a refill on antibiotics  He is complaining of weaker urinary stream and some abdominal pain  He denies any fevers, and verbalized he is hydrating  What do you recommend for him? He is awaiting for our return call with next step

## 2022-09-21 ENCOUNTER — APPOINTMENT (OUTPATIENT)
Dept: LAB | Facility: CLINIC | Age: 37
End: 2022-09-21
Payer: COMMERCIAL

## 2022-09-21 PROCEDURE — 87070 CULTURE OTHR SPECIMN AEROBIC: CPT

## 2022-09-23 LAB — BACTERIA SMN CULT: NORMAL

## 2022-09-26 ENCOUNTER — APPOINTMENT (OUTPATIENT)
Dept: LAB | Facility: CLINIC | Age: 37
End: 2022-09-26
Payer: COMMERCIAL

## 2022-09-26 ENCOUNTER — CONSULT (OUTPATIENT)
Dept: INFECTIOUS DISEASES | Facility: CLINIC | Age: 37
End: 2022-09-26
Payer: COMMERCIAL

## 2022-09-26 VITALS
RESPIRATION RATE: 16 BRPM | HEIGHT: 69 IN | HEART RATE: 52 BPM | WEIGHT: 155 LBS | DIASTOLIC BLOOD PRESSURE: 62 MMHG | BODY MASS INDEX: 22.96 KG/M2 | OXYGEN SATURATION: 98 % | TEMPERATURE: 98 F | SYSTOLIC BLOOD PRESSURE: 136 MMHG

## 2022-09-26 DIAGNOSIS — N41.0 ACUTE PROSTATITIS: ICD-10-CM

## 2022-09-26 DIAGNOSIS — A49.8 STAPHYLOCOCCUS EPIDERMIDIS INFECTION: ICD-10-CM

## 2022-09-26 DIAGNOSIS — N41.1 CHRONIC PROSTATITIS: Primary | ICD-10-CM

## 2022-09-26 LAB
ALBUMIN SERPL BCP-MCNC: 4.5 G/DL (ref 3.5–5)
ALP SERPL-CCNC: 64 U/L (ref 34–104)
ALT SERPL W P-5'-P-CCNC: 14 U/L (ref 7–52)
ANION GAP SERPL CALCULATED.3IONS-SCNC: 6 MMOL/L (ref 4–13)
AST SERPL W P-5'-P-CCNC: 26 U/L (ref 13–39)
BASOPHILS # BLD AUTO: 0.06 THOUSANDS/ΜL (ref 0–0.1)
BASOPHILS NFR BLD AUTO: 1 % (ref 0–1)
BILIRUB SERPL-MCNC: 1.31 MG/DL (ref 0.2–1)
BUN SERPL-MCNC: 14 MG/DL (ref 5–25)
CALCIUM SERPL-MCNC: 9.3 MG/DL (ref 8.4–10.2)
CHLORIDE SERPL-SCNC: 106 MMOL/L (ref 96–108)
CO2 SERPL-SCNC: 26 MMOL/L (ref 21–32)
CREAT SERPL-MCNC: 1.15 MG/DL (ref 0.6–1.3)
CRP SERPL QL: <1 MG/L
EOSINOPHIL # BLD AUTO: 0.23 THOUSAND/ΜL (ref 0–0.61)
EOSINOPHIL NFR BLD AUTO: 4 % (ref 0–6)
ERYTHROCYTE [DISTWIDTH] IN BLOOD BY AUTOMATED COUNT: 13.8 % (ref 11.6–15.1)
ERYTHROCYTE [SEDIMENTATION RATE] IN BLOOD: 2 MM/HOUR (ref 0–14)
GFR SERPL CREATININE-BSD FRML MDRD: 82 ML/MIN/1.73SQ M
GLUCOSE SERPL-MCNC: 86 MG/DL (ref 65–140)
HCT VFR BLD AUTO: 44.6 % (ref 36.5–49.3)
HGB BLD-MCNC: 14.7 G/DL (ref 12–17)
IMM GRANULOCYTES # BLD AUTO: 0.01 THOUSAND/UL (ref 0–0.2)
IMM GRANULOCYTES NFR BLD AUTO: 0 % (ref 0–2)
LYMPHOCYTES # BLD AUTO: 2.44 THOUSANDS/ΜL (ref 0.6–4.47)
LYMPHOCYTES NFR BLD AUTO: 44 % (ref 14–44)
MCH RBC QN AUTO: 29.4 PG (ref 26.8–34.3)
MCHC RBC AUTO-ENTMCNC: 33 G/DL (ref 31.4–37.4)
MCV RBC AUTO: 89 FL (ref 82–98)
MONOCYTES # BLD AUTO: 0.53 THOUSAND/ΜL (ref 0.17–1.22)
MONOCYTES NFR BLD AUTO: 10 % (ref 4–12)
NEUTROPHILS # BLD AUTO: 2.26 THOUSANDS/ΜL (ref 1.85–7.62)
NEUTS SEG NFR BLD AUTO: 41 % (ref 43–75)
NRBC BLD AUTO-RTO: 0 /100 WBCS
PLATELET # BLD AUTO: 246 THOUSANDS/UL (ref 149–390)
PMV BLD AUTO: 9.3 FL (ref 8.9–12.7)
POTASSIUM SERPL-SCNC: 3.9 MMOL/L (ref 3.5–5.3)
PROT SERPL-MCNC: 6.9 G/DL (ref 6.4–8.4)
RBC # BLD AUTO: 5 MILLION/UL (ref 3.88–5.62)
SODIUM SERPL-SCNC: 138 MMOL/L (ref 135–147)
WBC # BLD AUTO: 5.53 THOUSAND/UL (ref 4.31–10.16)

## 2022-09-26 PROCEDURE — 86140 C-REACTIVE PROTEIN: CPT | Performed by: INTERNAL MEDICINE

## 2022-09-26 PROCEDURE — 99204 OFFICE O/P NEW MOD 45 MIN: CPT | Performed by: INTERNAL MEDICINE

## 2022-09-26 PROCEDURE — 36415 COLL VENOUS BLD VENIPUNCTURE: CPT | Performed by: INTERNAL MEDICINE

## 2022-09-26 PROCEDURE — 85025 COMPLETE CBC W/AUTO DIFF WBC: CPT | Performed by: INTERNAL MEDICINE

## 2022-09-26 PROCEDURE — 85652 RBC SED RATE AUTOMATED: CPT | Performed by: INTERNAL MEDICINE

## 2022-09-26 PROCEDURE — 80053 COMPREHEN METABOLIC PANEL: CPT | Performed by: INTERNAL MEDICINE

## 2022-09-26 NOTE — PROGRESS NOTES
Consultation - Infectious Disease   Kimmy Cruz 31 y o  male MRN: [id number]    Encounter: [id number]    IMPRESSION/RECOMMENDATIONS:   1  Chronic prostatitis:  With symptoms of testicular pain, discomfort, dysuria, perirectal discomfort  Symptoms have been present for nearly 2 months  He did have some interval improvement while on doxycycline although his symptoms appeared to have plateaued  Semen cultures from earlier this month demonstrated growth of Staphylococcus epidermidis which was resistant to doxycycline and patient was thereafter transitioned to Bactrim  Scortal US and CT abd were significant for left varicocele  Patient will complete a 2 week course of Bactrim today  Although Staph epidermidis is not considered a usual pathogen for prostatitis, given patient's ongoing symptoms, continued antibiotic therapy is advised  Noted CRP is within normal limits  DDx:  Chronic pelvic pain syndrome but this is considered a diagnosis of exclusion  · Continue Bactrim DS 1 tab po q12 hours but extend to 4 week course through Oct 10th  · Labs every 2 weeks: CBC diff, CMP, ESR, Crp  · Scrotal support and pain control as per urology service  · Pt has f/u appt with urology in November  · ID follow-up in 2 weeks    My recommendations were discussed with the patient in detail who verbalized understanding  Thank you for allowing me to participate in the care of this patient  The ID service will follow  HISTORY OF PRESENT ILLNESS:  Reason for Consult: prostatitis  CC: testicular pain  HPI: Kimmy Cruz is a 31y o  year old male referred by urology service for evaluation of chronic prostatitis  Pt reports chronic testicular pain x 2 months, which was initially assumed to be due to epididymitis  He was evaluated at an urgent care center on 7/28/2022 for symptoms of dysuria and testicular discomfort  Patient states he engaged in sexual activity a few weeks prior and mountain biking for 3 consecutive days   At the time, he reported urine frequency, weak urine stream, mild scrotal redness and swelling   Patient was presumed to have epididymitis and was prescribed a 7 day course of levofloxacin   Urine culture and GC chlamydia testing were negative     He was evaluated by the urology service last month and continued to report symptoms of testicular pressure, discomfort and perirectal area and feeling of burning sensation  He denies pain with ejaculation or hematospermia  Patient denies gross hematuria  His levofloxacin was extended to a 14-day course  Semen culture was obtained which had growth of Staphylococcus epidermidis and urine culture was  was negative  Scrotal ultrasound demonstrated mild left varicocele  He presented to the ER on August 26th for persistent symptoms of a burning sensation with urination and ejaculation  At that time, he was to finish a course of doxycycline  CT scan was obtained and was unremarkable other than constipation      On 9/8, he was seen by urology service  He reported some interval improvement with doxycycline x 2 courses but thinks his symptoms had plateaued  He was thereafter prescribed cefdinir, but pt did not take it based on his semen culture  Based on semen culture, pt was prescribed a 2 week course of Bactrim and provided an additional 2 week refill of this  Pt denies fevers, but has had occasional cold sweats and palpitations  This is followed by myalgias  REVIEW OF SYSTEMS:  Constitutional: +cold sweats  Negative for fever, chills  HENT: Negative headache, runny nose, sore throat, congestion  Eyes: Negative for change in vision  Respiratory: Negative cough, shortness of breath  Cardiovascular: +palpitations  Negative for chest pain  Gastrointestinal: Negative for abdominal pain, nausea, vomiting, diarrhea  Genitourinary:  Positive for dysuria, testicular pain, scrotal swelling per HPI  Negative for hematuria  Musculoskeletal: +myalgias   Negative for arthritis  Skin:  Negative for rash or wound  Neurological: Negative for syncope, focal weakness  Hematological: Negative for excessive bruising, bleeding  Psychiatric/Behavioral: Negative for confusion, hallucination  PAST MEDICAL HISTORY:  Past Medical History:   Diagnosis Date    Epididymitis 2022     Past Surgical History:   Procedure Laterality Date    HERNIA REPAIR  82    7 months old     FAMILY HISTORY:  Negative for recurrent infection  Family History   Problem Relation Age of Onset    Urolithiasis Mother         on and off    No Known Problems Father     Cancer Maternal Grandmother         lung cancer/    Evelina Courser Cancer Paternal Grandfather         Prostate cancer/     Diabetes Paternal Grandfather         diagnosed in his 45s     SOCIAL HISTORY:  Social History   Social History     Substance and Sexual Activity   Alcohol Use Not Currently    Comment: less than 2 drinks a month     Social History     Substance and Sexual Activity   Drug Use No     Social History     Tobacco Use   Smoking Status Never Smoker   Smokeless Tobacco Never Used   Tobacco Comment    N/A   Lives at home alone  Occupation: law enforcement  Currently single  1 female partner in past few months  No children  No pets  ALLERGIES:  No Known Allergies    MEDICATIONS:  All current active medications have been reviewed    Antibiotics: Bactrim    Current Outpatient Medications:     sulfamethoxazole-trimethoprim (BACTRIM DS) 800-160 mg per tablet, Take 1 tablet by mouth every 12 (twelve) hours for 14 days, Disp: 28 tablet, Rfl: 0    PHYSICAL EXAM:  Vitals:    22 1022   BP: 136/62   BP Location: Left arm   Patient Position: Sitting   Cuff Size: Adult   Pulse: (!) 52   Resp: 16   Temp: 98 °F (36 7 °C)   TempSrc: Temporal   SpO2: 98%   Weight: 70 3 kg (155 lb)   Height: 5' 9" (1 753 m)     General Appearance:  Appearing well, nontoxic, and in no distress   Head:  Normocephalic, without obvious abnormality, atraumatic   Eyes:  Conjunctiva pink and sclera anicteric, both eyes   Nose: Nasal mucosa moist    Throat: oral mucosa moist   Neck: Supple   Lungs:   Clear to auscultation bilaterally, respirations unlabored   Heart:  S1, S2, RRR; no murmur   Abdomen:   Soft, non-tender, non-distended   : Circumcised phallus, no scrotal or testicular tenderness to palpation during exam   No penile or scrotal lesions noted  Patient was examined in the presence of [address]  Extremities: No distal leg edema b/l   Skin: No rash or lesions  Lymph nodes: Cervical, supraclavicular nodes normal   Neurologic: Alert and oriented times to person, surroundings, conversant, fluent speech, JOINER x 4     LABS, IMAGING, & OTHER STUDIES:  Lab Results:  I have personally reviewed pertinent labs, cultures,  Lab Results   Component Value Date    K 4 0 08/26/2022     08/26/2022    CO2 26 08/26/2022    BUN 18 08/26/2022    CREATININE 1 01 08/26/2022    GLUF 85 08/23/2022    CALCIUM 9 3 08/26/2022    AST 22 08/26/2022    ALT 11 08/26/2022    ALKPHOS 58 08/26/2022    EGFR 96 08/26/2022     Lab Results   Component Value Date    WBC 7 75 08/26/2022    HGB 14 2 08/26/2022    HCT 42 2 08/26/2022    MCV 89 08/26/2022     08/26/2022 09/08/2022 CRP less than 1 0, urine culture no growth  08/26/2022 RPR:  Nonreactive  08/23/2022 HIV screen nonreactive  9/21/22 semen cx:  1+ growth of mixed skin north    9/9/22 semen cx:  1+ growth of Staphylococcus epidermidis, resistant to tetracycline, sensitive to Bactrim    8/8/22 semen culture:  Growth of coagulase-negative Staphylococcus in broth culture    7/28/22 urine GC chlamydia negative    Imaging Studies:   8/26/22 CT abd/pelv:  Findings suggestive of constipation  No evidence of hydronephrosis or urinary tract calculi  Reproductive organs are unremarkable for patient's age  8/5/22 US scrotum, testicles: Left varicocele noted    No sonographic evidence to suggest epididymitis  I have personally reviewed pertinent imaging study reports and images in PACS

## 2022-09-26 NOTE — PATIENT INSTRUCTIONS
Continue taking Bactrim as ordered      Labs to be completed prior to next appointment    Follow up in 2 weeks

## 2022-09-30 ENCOUNTER — HOSPITAL ENCOUNTER (OUTPATIENT)
Dept: RADIOLOGY | Facility: HOSPITAL | Age: 37
Discharge: HOME | End: 2022-09-30
Payer: COMMERCIAL

## 2022-09-30 DIAGNOSIS — S56.912S: ICD-10-CM

## 2022-09-30 DIAGNOSIS — M79.18 BRACHIORADIALIS MUSCLE TENDERNESS: ICD-10-CM

## 2022-09-30 PROCEDURE — 76882 US LMTD JT/FCL EVL NVASC XTR: CPT

## 2022-10-04 ENCOUNTER — APPOINTMENT (OUTPATIENT)
Dept: LAB | Facility: CLINIC | Age: 37
End: 2022-10-04
Payer: COMMERCIAL

## 2022-10-04 DIAGNOSIS — N41.1 CHRONIC PROSTATITIS: ICD-10-CM

## 2022-10-04 LAB
ALBUMIN SERPL BCP-MCNC: 4.5 G/DL (ref 3.5–5)
ALP SERPL-CCNC: 66 U/L (ref 34–104)
ALT SERPL W P-5'-P-CCNC: 14 U/L (ref 7–52)
ANION GAP SERPL CALCULATED.3IONS-SCNC: 6 MMOL/L (ref 4–13)
AST SERPL W P-5'-P-CCNC: 25 U/L (ref 13–39)
BASOPHILS # BLD AUTO: 0.02 THOUSANDS/ΜL (ref 0–0.1)
BASOPHILS NFR BLD AUTO: 1 % (ref 0–1)
BILIRUB SERPL-MCNC: 1.32 MG/DL (ref 0.2–1)
BUN SERPL-MCNC: 11 MG/DL (ref 5–25)
CALCIUM SERPL-MCNC: 9.3 MG/DL (ref 8.4–10.2)
CHLORIDE SERPL-SCNC: 104 MMOL/L (ref 96–108)
CO2 SERPL-SCNC: 28 MMOL/L (ref 21–32)
CREAT SERPL-MCNC: 1.25 MG/DL (ref 0.6–1.3)
CRP SERPL QL: <1 MG/L
EOSINOPHIL # BLD AUTO: 0.28 THOUSAND/ΜL (ref 0–0.61)
EOSINOPHIL NFR BLD AUTO: 7 % (ref 0–6)
ERYTHROCYTE [DISTWIDTH] IN BLOOD BY AUTOMATED COUNT: 13.8 % (ref 11.6–15.1)
ERYTHROCYTE [SEDIMENTATION RATE] IN BLOOD: 2 MM/HOUR (ref 0–14)
GFR SERPL CREATININE-BSD FRML MDRD: 74 ML/MIN/1.73SQ M
GLUCOSE SERPL-MCNC: 80 MG/DL (ref 65–140)
HCT VFR BLD AUTO: 44.4 % (ref 36.5–49.3)
HGB BLD-MCNC: 14.7 G/DL (ref 12–17)
IMM GRANULOCYTES # BLD AUTO: 0 THOUSAND/UL (ref 0–0.2)
IMM GRANULOCYTES NFR BLD AUTO: 0 % (ref 0–2)
LYMPHOCYTES # BLD AUTO: 1.83 THOUSANDS/ΜL (ref 0.6–4.47)
LYMPHOCYTES NFR BLD AUTO: 42 % (ref 14–44)
MCH RBC QN AUTO: 29.8 PG (ref 26.8–34.3)
MCHC RBC AUTO-ENTMCNC: 33.1 G/DL (ref 31.4–37.4)
MCV RBC AUTO: 90 FL (ref 82–98)
MONOCYTES # BLD AUTO: 0.57 THOUSAND/ΜL (ref 0.17–1.22)
MONOCYTES NFR BLD AUTO: 13 % (ref 4–12)
NEUTROPHILS # BLD AUTO: 1.56 THOUSANDS/ΜL (ref 1.85–7.62)
NEUTS SEG NFR BLD AUTO: 37 % (ref 43–75)
NRBC BLD AUTO-RTO: 0 /100 WBCS
PLATELET # BLD AUTO: 232 THOUSANDS/UL (ref 149–390)
PMV BLD AUTO: 9.4 FL (ref 8.9–12.7)
POTASSIUM SERPL-SCNC: 4 MMOL/L (ref 3.5–5.3)
PROT SERPL-MCNC: 7.1 G/DL (ref 6.4–8.4)
RBC # BLD AUTO: 4.93 MILLION/UL (ref 3.88–5.62)
SODIUM SERPL-SCNC: 138 MMOL/L (ref 135–147)
WBC # BLD AUTO: 4.26 THOUSAND/UL (ref 4.31–10.16)

## 2022-10-04 PROCEDURE — 85652 RBC SED RATE AUTOMATED: CPT

## 2022-10-04 PROCEDURE — 80053 COMPREHEN METABOLIC PANEL: CPT

## 2022-10-04 PROCEDURE — 85025 COMPLETE CBC W/AUTO DIFF WBC: CPT

## 2022-10-04 PROCEDURE — 36415 COLL VENOUS BLD VENIPUNCTURE: CPT

## 2022-10-04 PROCEDURE — 86140 C-REACTIVE PROTEIN: CPT

## 2022-10-10 ENCOUNTER — OFFICE VISIT (OUTPATIENT)
Dept: INFECTIOUS DISEASES | Facility: CLINIC | Age: 37
End: 2022-10-10
Payer: COMMERCIAL

## 2022-10-10 VITALS
TEMPERATURE: 97.8 F | BODY MASS INDEX: 22.72 KG/M2 | SYSTOLIC BLOOD PRESSURE: 116 MMHG | HEIGHT: 69 IN | HEART RATE: 69 BPM | DIASTOLIC BLOOD PRESSURE: 62 MMHG | OXYGEN SATURATION: 98 % | WEIGHT: 153.4 LBS | RESPIRATION RATE: 16 BRPM

## 2022-10-10 DIAGNOSIS — A49.8 STAPHYLOCOCCUS EPIDERMIDIS INFECTION: ICD-10-CM

## 2022-10-10 DIAGNOSIS — Z79.2 LONG TERM (CURRENT) USE OF ANTIBIOTICS: ICD-10-CM

## 2022-10-10 DIAGNOSIS — N41.1 CHRONIC PROSTATITIS: Primary | ICD-10-CM

## 2022-10-10 PROBLEM — N41.0 ACUTE PROSTATITIS: Status: RESOLVED | Noted: 2022-09-12 | Resolved: 2022-10-10

## 2022-10-10 PROCEDURE — 99214 OFFICE O/P EST MOD 30 MIN: CPT | Performed by: INTERNAL MEDICINE

## 2022-10-10 RX ORDER — SULFAMETHOXAZOLE AND TRIMETHOPRIM 800; 160 MG/1; MG/1
1 TABLET ORAL EVERY 12 HOURS SCHEDULED
Qty: 28 TABLET | Refills: 0 | Status: SHIPPED | OUTPATIENT
Start: 2022-10-10 | End: 2022-10-24

## 2022-10-10 NOTE — PATIENT INSTRUCTIONS
Continue Bactrim DS 1 tab po q12 hours and extend to 6 week course through Oct 24th given chronicity and persistence of residual symptoms  Scrotal support and pain control as per urology service  Pt has f/u appt with urology in November ID follow-up as needed

## 2022-10-10 NOTE — PROGRESS NOTES
Progress Note - Infectious Disease   Betsey Dasilva 31 y o  male MRN: [id number]   Encounter: [id number]    Impression/Plan:  1  Chronic prostatitis:  With symptoms of testicular pain, discomfort, dysuria, perirectal discomfort  Symptoms have been present for about 2 months  He did have some interval improvement while on doxycycline although his symptoms appeared to have plateaued  Semen cultures from Sept 2022 demonstrated growth of Staphylococcus epidermidis which was resistant to doxycycline and patient was thereafter transitioned to Bactrim  Scortal US and CT abd were significant for left varicocele  Although Staph epidermidis is not considered a usual pathogen for prostatitis, given patient's ongoing but improved symptoms, continued antibiotic therapy is advised  Noted CRP is within normal limits  Noted overall improvement of symptoms while on Bactrim  ? Continue Bactrim DS 1 tab po q12 hours and extend to 6 week course through Oct 24th given chronicity and persistence of residual symptoms  ? Scrotal support and pain control as per urology service  ? Pt has f/u appt with urology in November  ? ID follow-up as needed if symptoms persist or worsen    My recommendations were discussed with the patient in detail who verbalized understanding  Antibiotics: Bactrim DS    Current Outpatient Medications:   •  sulfamethoxazole-trimethoprim (BACTRIM DS) 800-160 mg per tablet, Take 1 tablet by mouth every 12 (twelve) hours for 14 days, Disp: 28 tablet, Rfl: 0    Subjective:  Patient seen for follow-up regarding chronic prostatitis  He has been compliant with Bactrim DS and has not missed any doses since his last visit  He is tolerating Bactrim well  He reports some interval improvement of his symptoms since last visit, although not yet resolved  He reports less testicular pressure and pain  He still has to strain and push when emptying his bladder  He denies dysuria, or hematospermia   He denies fevers, chills, myalgias  Patient has no nausea, vomiting, diarrhea; no cough, shortness of breath  Objective:  Vitals:  Vitals:    10/10/22 1014   BP: 116/62   BP Location: Left arm   Patient Position: Sitting   Cuff Size: Adult   Pulse: 69   Resp: 16   Temp: 97 8 °F (36 6 °C)   TempSrc: Temporal   SpO2: 98%   Weight: 69 6 kg (153 lb 6 4 oz)   Height: 5' 9" (1 753 m)     Physical Exam:   General Appearance:  Alert, interactive, nontoxic, no acute distress  Throat: Oral mucosa is moist   Lungs:   Clear to auscultation bilaterally; no wheezes, respirations unlabored   Heart:  S1, S2, RRR; no murmur   Abdomen:   Soft, non-tender, non-distended   : No Teran catheter present, no suprapubic tenderness   Extremities: No distal leg edema b/l   Neurologic: AAO to person, surroundings, conversant, fluent speech   Skin: No rash or lesions  Labs, Imaging, & Other studies:   All pertinent labs, cultures and imaging reports were personally reviewed  Results from last 7 days   Lab Units 10/04/22  1546   WBC Thousand/uL 4 26*   HEMOGLOBIN g/dL 14 7   PLATELETS Thousands/uL 232     Results from last 7 days   Lab Units 10/04/22  1546   SODIUM mmol/L 138   POTASSIUM mmol/L 4 0   CHLORIDE mmol/L 104   CO2 mmol/L 28   BUN mg/dL 11   CREATININE mg/dL 1 25   EGFR ml/min/1 73sq m 74   CALCIUM mg/dL 9 3   AST U/L 25   ALT U/L 14   ALK PHOS U/L 66     10/4/22 ESR: 2    Results from last 7 days   Lab Units 10/04/22  1546   CRP mg/L <1 0     09/08/2022 CRP less than 1 0, urine culture no growth  08/26/2022 RPR:  Nonreactive  08/23/2022 HIV screen nonreactive  9/21/22 semen cx:  1+ growth of mixed skin north     9/9/22 semen cx:  1+ growth of Staphylococcus epidermidis, resistant to tetracycline, sensitive to Bactrim     8/8/22 semen culture:  Growth of coagulase-negative Staphylococcus in broth culture     7/28/22 urine GC chlamydia negative    Imaging Studies:   8/26/22 CT abd/pelv:  Findings suggestive of constipation    No evidence of hydronephrosis or urinary tract calculi    Reproductive organs are unremarkable for patient's age      8/5/22 US scrotum, testicles: Left varicocele noted   No sonographic evidence to suggest epididymitis

## 2022-11-22 ENCOUNTER — TELEPHONE (OUTPATIENT)
Dept: UROLOGY | Facility: AMBULATORY SURGERY CENTER | Age: 37
End: 2022-11-22

## 2022-11-22 ENCOUNTER — OFFICE VISIT (OUTPATIENT)
Dept: UROLOGY | Facility: AMBULATORY SURGERY CENTER | Age: 37
End: 2022-11-22

## 2022-11-22 VITALS
SYSTOLIC BLOOD PRESSURE: 120 MMHG | HEIGHT: 69 IN | BODY MASS INDEX: 22.99 KG/M2 | DIASTOLIC BLOOD PRESSURE: 70 MMHG | WEIGHT: 155.2 LBS | OXYGEN SATURATION: 100 % | RESPIRATION RATE: 16 BRPM | HEART RATE: 64 BPM

## 2022-11-22 DIAGNOSIS — N41.1 CHRONIC PROSTATITIS: ICD-10-CM

## 2022-11-22 DIAGNOSIS — N41.0 ACUTE PROSTATITIS: Primary | ICD-10-CM

## 2022-11-22 RX ORDER — CEFDINIR 300 MG/1
300 CAPSULE ORAL EVERY 12 HOURS SCHEDULED
Qty: 20 CAPSULE | Refills: 0 | Status: SHIPPED | OUTPATIENT
Start: 2022-11-22 | End: 2022-11-22 | Stop reason: SDUPTHER

## 2022-11-22 RX ORDER — CEFDINIR 300 MG/1
300 CAPSULE ORAL EVERY 12 HOURS SCHEDULED
Qty: 40 CAPSULE | Refills: 0 | Status: SHIPPED | OUTPATIENT
Start: 2022-11-22 | End: 2022-12-12

## 2022-11-22 NOTE — ASSESSMENT & PLAN NOTE
I am not sure what the etiology of the patient's ongoing multifocal  pain is  I had a long conversation with him regarding this including the role for neuropathic pain medications versus additional antibiotics  I do not favor giving more antibiotics as he has had not had definitive culture data showing infection (staph epi on semen analysis is questionable), has had multiple long courses already and there is risk for inadvertent side effects  The patient however thinks the antibiotics have helped him and wants to try another course  I will write him for 1 month of cefdinir and refer him back to Infectious Disease for further input  I will see him back in 2 months and we will perform cystoscopy at that time if he is still having dysuria but made it clear I think this is of low utility

## 2022-11-22 NOTE — Clinical Note
This patient is still experiencing multifocal  pain  I cannot explain the etiology  I told him I do not think additional antibiotics are a good idea but he really wants it because he thinks antibiotics have helped in the past   I would appreciate if you would consider seeing him back

## 2022-11-22 NOTE — PROGRESS NOTES
Assessment/Plan:    Chronic prostatitis  I am not sure what the etiology of the patient's ongoing multifocal  pain is  I had a long conversation with him regarding this including the role for neuropathic pain medications versus additional antibiotics  I do not favor giving more antibiotics as he has had not had definitive culture data showing infection (staph epi on semen analysis is questionable), has had multiple long courses already and there is risk for inadvertent side effects  The patient however thinks the antibiotics have helped him and wants to try another course  I will write him for 1 month of cefdinir and refer him back to Infectious Disease for further input  I will see him back in 2 months and we will perform cystoscopy at that time if he is still having dysuria but made it clear I think this is of low utility  Subjective:      Patient ID: Stephane Kaiser is a 31 y o  male  HPI    31 y  o  male  with history of what has become chronic testicular pain assumed to be epididymitis and/or prostatitis         7/28/2022 seen at urgent care for symptoms of dysuria and testicular discomfort   Patient states he engaged in sexual activity a few weeks prior as well as mountain biking for 3 consecutive days  Ike Jacobo notes mild redness and swelling   Patient was presumed to have epididymitis and was prescribed a 7 day course of Levaquin   Urine culture and GC chlamydia testing were negative        August 5, 2022 seen in urology clinic and reported testicular pressure, discomfort and eden rectal area and feeling of burning sensation   Patient has since had sexual activity with same partner  Ike Jacobo denies any pain with ejaculation or hematospermia   Patient denies any additional lower urinary tract symptoms or gross hematuria       We obtained a semen culture (coag neg staph only) and urine culture which was negative   Scrotal ultrasound was overall unremarkable with a mild left varicocele      August 26th 2022 pt prented to the ER for persistent symptoms of a burning sensation with urination and ejaculation  At that time he was finishing a course of doxycycline  CT scan was obtained and was unremarkable other than constipation  Blood work including RPR, CBC, UA and CMP were unremarkable     Sep 8th 2022 seen in urology clinic and said doxycycline overall helped his pain some but that improvement plateaued and he is still having issues  He now thinks his pain is more cyst localized to the prostate  He is very frustrated and upset about his course  We switched his antibiotics cefdinir at his request (but did not take it) and place referral to Infectious Disease and repeated semen culture which showed no growth  September 26, 2022 seen in infectious disease clinic and was transitioned to Bactrim for 2 weeks  CRP and ESR checked and normal in September and October  October 10, 2022 seen back in ID clinic and reports some improvement in his overall pain and testicular pressure on Bactrim so course was extended to be a another 4 weeks  November 22, 2022:  Patient reports his pain was somewhat better on Bactrim but he is now done was course any think his pain is worse again  He reports bilateral testicular pain as well as prostatic discomfort and also feels like his bilateral inguinal lymph nodes are enlarged and "on fire" by the end of the day  Also reports dysuria      CX data:  7/28/22 Ucx: negative   7/28/22 GC: negative  8/08/22 semen cx: Coag neg staph  9/01/22 semen cx: 1+ staph epi  9/21/22 semen cx: no growth      He denies any prior urologic history, surgical intervention, or instrumentation   Patient denies any prior history of undescended testicle or testicular torsion  Shante Tinsley states his grandfather had a history of prostate cancer      Past Surgical History:   Procedure Laterality Date   • HERNIA REPAIR  0    8 months old        Past Medical History:   Diagnosis Date   • Epididymitis 07/28/2022             Review of Systems   Constitutional: Negative for chills and fever  HENT: Negative for ear pain and sore throat  Eyes: Negative for pain and visual disturbance  Respiratory: Negative for cough and shortness of breath  Cardiovascular: Negative for chest pain and palpitations  Gastrointestinal: Negative for abdominal pain and vomiting  Genitourinary: Positive for testicular pain  Negative for dysuria and hematuria  Musculoskeletal: Negative for arthralgias and back pain  Skin: Negative for color change and rash  Neurological: Negative for seizures and syncope  All other systems reviewed and are negative  Objective:      /70 (BP Location: Left arm, Patient Position: Sitting, Cuff Size: Large)   Pulse 64   Resp 16   Ht 5' 9" (1 753 m)   Wt 70 4 kg (155 lb 3 2 oz)   SpO2 100%   BMI 22 92 kg/m²     No results found for: PSA       Physical Exam  Constitutional:       General: He is not in acute distress  Appearance: Normal appearance  He is not toxic-appearing  HENT:      Head: Normocephalic and atraumatic  Eyes:      Extraocular Movements: Extraocular movements intact  Conjunctiva/sclera: Conjunctivae normal       Pupils: Pupils are equal, round, and reactive to light  Cardiovascular:      Rate and Rhythm: Normal rate  Pulses: Normal pulses  Pulmonary:      Effort: Pulmonary effort is normal    Abdominal:      General: Abdomen is flat  Palpations: Abdomen is soft  Tenderness: There is no abdominal tenderness  There is no right CVA tenderness, left CVA tenderness, guarding or rebound  Genitourinary:     Penis: Normal        Testes: Normal       Prostate: Normal       Comments: His penal testicular and prostate exams are overall unremarkable  His prostate may be slightly boggy  I do not feel only lymph nodes in his groin areas on either side    He does report some mild tenderness to palpation of the epididymis bilaterally but not of the testicles  Skin:     General: Skin is warm and dry  Neurological:      Mental Status: He is alert and oriented to person, place, and time  Mental status is at baseline  Psychiatric:         Mood and Affect: Mood normal          Behavior: Behavior normal          Thought Content: Thought content normal          Judgment: Judgment normal            Orders  No orders of the defined types were placed in this encounter

## 2022-11-22 NOTE — TELEPHONE ENCOUNTER
Patient had an appointment today with Dr Les Cummings in [facility]  Patient stated Dr Les Cummings was going to order 30 days of cefdinir and when he went to the pharmacy to   his prescription it was only sent it for 10 days      Patient requesting a call back at [phone]

## 2022-11-23 ENCOUNTER — TELEPHONE (OUTPATIENT)
Dept: INFECTIOUS DISEASES | Facility: CLINIC | Age: 37
End: 2022-11-23

## 2022-11-23 NOTE — TELEPHONE ENCOUNTER
LM for patient to CB  OK to schedule per Dr Dennis Aburto with him  Pt referred back for f/u from Dr Nickie Fraire

## 2022-11-28 ENCOUNTER — TELEPHONE (OUTPATIENT)
Dept: INFECTIOUS DISEASES | Facility: CLINIC | Age: 37
End: 2022-11-28

## 2022-11-28 NOTE — TELEPHONE ENCOUNTER
Patient calls that he had completed his course of abx end of October and symptoms restarted  Patient visited urology and referred patient back to us  Scheduled patient per Dr Up's availability

## 2022-12-05 ENCOUNTER — OFFICE VISIT (OUTPATIENT)
Dept: INFECTIOUS DISEASES | Facility: CLINIC | Age: 37
End: 2022-12-05

## 2022-12-05 VITALS
BODY MASS INDEX: 22.93 KG/M2 | SYSTOLIC BLOOD PRESSURE: 118 MMHG | RESPIRATION RATE: 17 BRPM | OXYGEN SATURATION: 98 % | HEIGHT: 69 IN | HEART RATE: 72 BPM | WEIGHT: 154.8 LBS | DIASTOLIC BLOOD PRESSURE: 68 MMHG | TEMPERATURE: 97.4 F

## 2022-12-05 DIAGNOSIS — N41.1 CHRONIC PROSTATITIS: Primary | ICD-10-CM

## 2022-12-05 RX ORDER — DIPHENOXYLATE HYDROCHLORIDE AND ATROPINE SULFATE 2.5; .025 MG/1; MG/1
1 TABLET ORAL DAILY
COMMUNITY

## 2022-12-05 RX ORDER — SULFAMETHOXAZOLE AND TRIMETHOPRIM 800; 160 MG/1; MG/1
1 TABLET ORAL EVERY 12 HOURS SCHEDULED
Qty: 28 TABLET | Refills: 0 | Status: SHIPPED | OUTPATIENT
Start: 2022-12-05 | End: 2022-12-19

## 2022-12-05 NOTE — PROGRESS NOTES
Progress Note - Infectious Disease   Betsey Dasilva 31 y o  male MRN: [id number]   Encounter: [id number]    Impression/Plan:  1  Chronic prostatitis:  With symptoms of testicular pain, discomfort, dysuria   Semen cultures from Sept 2022 demonstrated growth of Staphylococcus epidermidis which was resistant to doxycycline and patient was treated with Bactrim in Oct 2022  Scortal US and CT abd were significant for left varicocele  Although Staph epidermidis is not considered a usual pathogen for prostatitis  Pt's symptoms returned within 3 days of completing course of Bactrim    ? Check CBC diff, creatinine, ESR, Crp  ? Repeat semen culture  ? I counseled patient that I am uncertain if more antibiotic therapy is going to provide additional benefit  However, as there is a lack of alternative diagnosis or explanation for patient's ongoing symptoms, consider trial of antibiotic therapy again  ? Resume Bactrim DS 1 tab po q12 hours x 6 week course given chronicity and residual symptoms; may consider adjusting antibiotic therapy based on patient's clinical response and culture results  ? Pt has f/u appt with urology   ? ID follow-up in 2 weeks    My recommendations were discussed with the patient in detail who verbalized understanding      Antibiotics: none currently    Current Outpatient Medications:   •  Garlic 710 MG TABS, Take by mouth, Disp: , Rfl:   •  Lactobacillus Acid-Pectin (ACIDOPHILUS/CITRUS PECTIN PO), Take by mouth, Disp: , Rfl:   •  multivitamin (THERAGRAN) TABS, Take 1 tablet by mouth daily, Disp: , Rfl:   •  cefdinir (OMNICEF) 300 mg capsule, Take 1 capsule (300 mg total) by mouth every 12 (twelve) hours for 20 days This to add on to other script for 10 days for a total of 30 days (Patient not taking: Reported on 12/5/2022), Disp: 40 capsule, Rfl: 0   Pt did not start cefdinir that was prescribed by Dr Mary Morton, as he was awaiting today's appt    Subjective:  Patient seen for follow-up regarding chronic prostatitis  Pt completed his course of antibiotics on Oct 24th  His symptoms never went away while on Bactrim but were milder  About 72 hours later, his symptoms returned with burning discomfort and swelling of his groin lymph nodes  His symptoms remain intermittent  He reports burning upon urination  He denies having to strain to urinate, weak stream  He reports occasional testicular pressure  He reports some pain during ejaculation but no bloody sperm  He denies fevers, chills, myalgias  Patient has no nausea, vomiting; no cough, shortness of breath  Objective:  Vitals:  Vitals:    12/05/22 1356   BP: 118/68   Pulse: 72   Resp: 17   Temp: (!) 97 4 °F (36 3 °C)   SpO2: 98%   Weight: 70 2 kg (154 lb 12 8 oz)   Height: 5' 9" (1 753 m)   Physical Exam:   General Appearance:  Alert, interactive, nontoxic, no acute distress  Throat: Oral mucosa is moist   Lungs:   Clear to auscultation bilaterally; no wheezes, respirations unlabored   Heart:  S1, S2, RRR; no murmur   Abdomen:   Soft, non-tender, non-distended   : No Teran catheter present, no suprapubic tenderness, no scrotal or testicular tenderness  LN exam: B/L palpable, small, mobile, non-tender inguinal lymph nodes    Extremities: No distal leg edema b/l   Neurologic: AAO to person, surroundings, conversant, fluent speech   Skin: No rash or lesions  Labs, Imaging, & Other studies:   All pertinent labs, cultures and imaging reports were personally reviewed    10/4/22 ESR 2, Crp < 1 0  09/08/2022 urine culture no growth  08/26/2022 RPR:  Nonreactive  08/23/2022 HIV screen nonreactive  9/21/22 semen cx:  1+ growth of mixed skin north     9/9/22 semen cx:  1+ growth of Staphylococcus epidermidis, sensitive to Bactrim, clinda; resistant to tetracycline     8/8/22 semen culture:  Growth of coagulase-negative Staphylococcus in broth culture     7/28/22 urine GC chlamydia negative     Imaging Studies:   8/26/22 CT abd/pelv:  Findings suggestive of constipation   No evidence of hydronephrosis or urinary tract calculi   Reproductive organs are unremarkable for patient's age      8/5/22 US scrotum, testicles: Left varicocele noted   No sonographic evidence to suggest epididymitis

## 2022-12-05 NOTE — PATIENT INSTRUCTIONS
Check CBC diff, creatinine, ESR, Crp  Repeat semen culture  I counseled the patient that I am uncertain if additional antibiotic therapy is going to provide additional benefit    However as there is a lack of alternative diagnosis or explanation for patient's ongoing symptoms antibiotic therapy, will consider trial of antibiotic therapy again  Resume Bactrim DS 1 tab po q12 hours x 6 week course given chronicity and residual symptoms; may consider adjusting antibiotic therapy based on patient's clinical response and culture results  Pt has f/u appt with urology   ID follow-up in 2 weeks

## 2022-12-06 ENCOUNTER — APPOINTMENT (OUTPATIENT)
Dept: LAB | Facility: CLINIC | Age: 37
End: 2022-12-06

## 2022-12-06 DIAGNOSIS — N41.1 CHRONIC PROSTATITIS: ICD-10-CM

## 2022-12-06 LAB
BASOPHILS # BLD AUTO: 0.07 THOUSANDS/ÂΜL (ref 0–0.1)
BASOPHILS NFR BLD AUTO: 1 % (ref 0–1)
CRP SERPL QL: <1 MG/L
EOSINOPHIL # BLD AUTO: 0.2 THOUSAND/ÂΜL (ref 0–0.61)
EOSINOPHIL NFR BLD AUTO: 3 % (ref 0–6)
ERYTHROCYTE [DISTWIDTH] IN BLOOD BY AUTOMATED COUNT: 13.4 % (ref 11.6–15.1)
ERYTHROCYTE [SEDIMENTATION RATE] IN BLOOD: 8 MM/HOUR (ref 0–14)
HCT VFR BLD AUTO: 44.2 % (ref 36.5–49.3)
HGB BLD-MCNC: 14.8 G/DL (ref 12–17)
IMM GRANULOCYTES # BLD AUTO: 0.03 THOUSAND/UL (ref 0–0.2)
IMM GRANULOCYTES NFR BLD AUTO: 0 % (ref 0–2)
LYMPHOCYTES # BLD AUTO: 2.72 THOUSANDS/ÂΜL (ref 0.6–4.47)
LYMPHOCYTES NFR BLD AUTO: 37 % (ref 14–44)
MCH RBC QN AUTO: 30.2 PG (ref 26.8–34.3)
MCHC RBC AUTO-ENTMCNC: 33.5 G/DL (ref 31.4–37.4)
MCV RBC AUTO: 90 FL (ref 82–98)
MONOCYTES # BLD AUTO: 0.71 THOUSAND/ÂΜL (ref 0.17–1.22)
MONOCYTES NFR BLD AUTO: 10 % (ref 4–12)
NEUTROPHILS # BLD AUTO: 3.55 THOUSANDS/ÂΜL (ref 1.85–7.62)
NEUTS SEG NFR BLD AUTO: 49 % (ref 43–75)
NRBC BLD AUTO-RTO: 0 /100 WBCS
PLATELET # BLD AUTO: 276 THOUSANDS/UL (ref 149–390)
PMV BLD AUTO: 9.5 FL (ref 8.9–12.7)
RBC # BLD AUTO: 4.9 MILLION/UL (ref 3.88–5.62)
WBC # BLD AUTO: 7.28 THOUSAND/UL (ref 4.31–10.16)

## 2022-12-09 LAB — BACTERIA SMN CULT: NORMAL

## 2022-12-16 ENCOUNTER — APPOINTMENT (OUTPATIENT)
Dept: LAB | Facility: CLINIC | Age: 37
End: 2022-12-16

## 2022-12-16 DIAGNOSIS — N41.1 CHRONIC PROSTATITIS: ICD-10-CM

## 2022-12-16 LAB
ALBUMIN SERPL BCP-MCNC: 4.5 G/DL (ref 3.5–5)
ALP SERPL-CCNC: 70 U/L (ref 34–104)
ALT SERPL W P-5'-P-CCNC: 10 U/L (ref 7–52)
ANION GAP SERPL CALCULATED.3IONS-SCNC: 8 MMOL/L (ref 4–13)
AST SERPL W P-5'-P-CCNC: 24 U/L (ref 13–39)
BILIRUB SERPL-MCNC: 0.89 MG/DL (ref 0.2–1)
BUN SERPL-MCNC: 18 MG/DL (ref 5–25)
CALCIUM SERPL-MCNC: 9.3 MG/DL (ref 8.4–10.2)
CHLORIDE SERPL-SCNC: 100 MMOL/L (ref 96–108)
CO2 SERPL-SCNC: 28 MMOL/L (ref 21–32)
CREAT SERPL-MCNC: 1.27 MG/DL (ref 0.6–1.3)
GFR SERPL CREATININE-BSD FRML MDRD: 73 ML/MIN/1.73SQ M
GLUCOSE SERPL-MCNC: 80 MG/DL (ref 65–140)
POTASSIUM SERPL-SCNC: 4 MMOL/L (ref 3.5–5.3)
PROT SERPL-MCNC: 7.2 G/DL (ref 6.4–8.4)
SODIUM SERPL-SCNC: 136 MMOL/L (ref 135–147)

## 2022-12-19 ENCOUNTER — OFFICE VISIT (OUTPATIENT)
Dept: INFECTIOUS DISEASES | Facility: CLINIC | Age: 37
End: 2022-12-19

## 2022-12-19 VITALS
DIASTOLIC BLOOD PRESSURE: 68 MMHG | HEIGHT: 69 IN | RESPIRATION RATE: 18 BRPM | SYSTOLIC BLOOD PRESSURE: 112 MMHG | TEMPERATURE: 97.8 F | WEIGHT: 158 LBS | BODY MASS INDEX: 23.4 KG/M2 | OXYGEN SATURATION: 98 % | HEART RATE: 60 BPM

## 2022-12-19 DIAGNOSIS — Z79.2 LONG TERM (CURRENT) USE OF ANTIBIOTICS: Primary | ICD-10-CM

## 2022-12-19 DIAGNOSIS — N41.1 CHRONIC PROSTATITIS: ICD-10-CM

## 2022-12-19 RX ORDER — SULFAMETHOXAZOLE AND TRIMETHOPRIM 800; 160 MG/1; MG/1
1 TABLET ORAL EVERY 12 HOURS SCHEDULED
Qty: 42 TABLET | Refills: 0 | Status: SHIPPED | OUTPATIENT
Start: 2022-12-19 | End: 2023-01-09

## 2022-12-19 RX ORDER — SULFAMETHOXAZOLE AND TRIMETHOPRIM 800; 160 MG/1; MG/1
1 TABLET ORAL EVERY 12 HOURS SCHEDULED
Qty: 28 TABLET | Refills: 0 | Status: SHIPPED | OUTPATIENT
Start: 2022-12-19 | End: 2022-12-19 | Stop reason: SDUPTHER

## 2022-12-19 NOTE — PROGRESS NOTES
Progress Note - Infectious Disease   Letty Rios 31 y o  male MRN: [id number]   Encounter: [id number]    Impression/Plan:  1  Chronic prostatitis:  With symptoms of testicular pain, discomfort, dysuria   Semen cultures from Sept 2022 demonstrated growth of Staphylococcus epidermidis which was resistant to doxycycline and patient was treated with Bactrim in Oct 2022  Scortal US and CT abd were significant for left varicocele  Although Staph epidermidis is not considered a usual pathogen for prostatitis  Pt's symptoms returned within 3 days of completing course of Bactrim  WBC, ESR, Crp are all WNL  Recent semen culture with growth of skin north  Pt reports symptom improvement since starting Bactrim  ? I counseled patient that I am uncertain if more antibiotic therapy is going to provide additional benefit  However, as there is a lack of alternative diagnosis or explanation for patient's symptoms, continue antibiotic therapy  ? Continue Bactrim DS 1 tab po q12 hours x 6 week course through Jan 16th   ? Check CBC diff, CMP prior to next appt  ? Pt has f/u appt with urology   ? ID follow-up in 3 weeks    My recommendations were discussed with the patient in detail who verbalized understanding  Antibiotics: Bactrim    Current Outpatient Medications:   •  Lactobacillus Acid-Pectin (ACIDOPHILUS/CITRUS PECTIN PO), Take by mouth, Disp: , Rfl:   •  sulfamethoxazole-trimethoprim (BACTRIM DS) 800-160 mg per tablet, Take 1 tablet by mouth every 12 (twelve) hours for 14 days, Disp: 28 tablet, Rfl: 0  •  Garlic 370 MG TABS, Take by mouth (Patient not taking: Reported on 12/19/2022), Disp: , Rfl:   •  multivitamin (THERAGRAN) TABS, Take 1 tablet by mouth daily (Patient not taking: Reported on 12/19/2022), Disp: , Rfl:     Subjective:  Patient seen for follow-up regarding chronic prostatitis   Pt says he is feeling better on oral antibiotics and supplements including (modified citrus pectin, turkey tail mushroom extract and biofilm defense)  He reports intermittent burning upon urination which is less intense  He has intermittent flares of swelling of his inguinal LN  He reports occasional testicular pressure  He reports some discomfort and less pain during ejaculation but no bloody sperm  He denies fevers, chills, myalgias  Patient has no nausea, vomiting; no cough, shortness of breath  He reports diarrhea intermittently  Objective:  Vitals:  Vitals:    12/19/22 1359   BP: 112/68   BP Location: Right arm   Patient Position: Sitting   Cuff Size: Adult   Pulse: 60   Resp: 18   Temp: 97 8 °F (36 6 °C)   TempSrc: Tympanic   SpO2: 98%   Weight: 71 7 kg (158 lb)   Height: 5' 9" (1 753 m)   Physical Exam:   General Appearance:  Alert, interactive, nontoxic, no acute distress  Throat: Oral mucosa is moist   Lungs:   Clear to auscultation bilaterally; no wheezes, respirations unlabored   Heart:  S1, S2, RRR; no murmur   Abdomen:   Soft, non-tender, non-distended   : No Teran catheter present, no suprapubic tenderness  B/L palpable, small, mobile, non-tender inguinal lymph nodes    Extremities: No distal leg edema b/l   Neurologic: AAO to person, surroundings, conversant, fluent speech   Skin: No rash or draining lesions        Labs, Imaging, & Other studies:   All pertinent labs, cultures and imaging reports were personally reviewed  Results from last 7 days   Lab Units 12/16/22  0804   WBC Thousand/uL 6 46   HEMOGLOBIN g/dL 14 1   PLATELETS Thousands/uL 260     Results from last 7 days   Lab Units 12/16/22  0804   SODIUM mmol/L 136   POTASSIUM mmol/L 4 0   CHLORIDE mmol/L 100   CO2 mmol/L 28   BUN mg/dL 18   CREATININE mg/dL 1 27   EGFR ml/min/1 73sq m 73   CALCIUM mg/dL 9 3   AST U/L 24   ALT U/L 10   ALK PHOS U/L 70     Results from last 7 days   Lab Units 12/16/22  0804   CRP mg/L 1 8     12/16/22 ESR 6  09/08/2022 urine culture no growth  08/26/2022 RPR:  Nonreactive  08/23/2022 HIV screen nonreactive    12/6/22 semen cx:  2+ growth of mixed skin north  9/21/22 semen cx:  1+ growth of mixed skin north  9/9/22 semen cx:  1+ growth of Staphylococcus epidermidis, sensitive to Bactrim, clinda; resistant to tetracycline  8/8/22 semen culture:  Growth of coagulase-negative Staphylococcus in broth culture     7/28/22 urine GC chlamydia negative     Imaging Studies:   8/26/22 CT abd/pelv:  Findings suggestive of constipation   No evidence of hydronephrosis or urinary tract calculi   Reproductive organs are unremarkable for patient's age      8/5/22 US scrotum, testicles: Left varicocele noted   No sonographic evidence to suggest epididymitis

## 2022-12-20 NOTE — PATIENT INSTRUCTIONS
I counseled patient that I am uncertain if more antibiotic therapy is going to provide additional benefit    However, as there is a lack of alternative diagnosis or explanation for patient's symptoms, continue antibiotic therapy  Continue Bactrim DS 1 tab po q12 hours x 6 week course through Jan 16th   Check CBC diff, CMP prior to next appt  Pt has f/u appt with urology   ID follow-up in 3 weeks

## 2023-01-05 ENCOUNTER — TELEPHONE (OUTPATIENT)
Dept: INFECTIOUS DISEASES | Facility: CLINIC | Age: 38
End: 2023-01-05

## 2023-01-05 NOTE — TELEPHONE ENCOUNTER
Called pt today regarding upcoming appt  Reminded pt of upcoming appt and to also have labs done prior to the appt  Pt states he will go get the labs done

## 2023-01-11 ENCOUNTER — APPOINTMENT (OUTPATIENT)
Dept: LAB | Facility: CLINIC | Age: 38
End: 2023-01-11

## 2023-01-11 DIAGNOSIS — N41.1 CHRONIC PROSTATITIS: ICD-10-CM

## 2023-01-11 LAB
ALBUMIN SERPL BCP-MCNC: 4.6 G/DL (ref 3.5–5)
ALP SERPL-CCNC: 61 U/L (ref 34–104)
ALT SERPL W P-5'-P-CCNC: 13 U/L (ref 7–52)
ANION GAP SERPL CALCULATED.3IONS-SCNC: 6 MMOL/L (ref 4–13)
AST SERPL W P-5'-P-CCNC: 25 U/L (ref 13–39)
BASOPHILS # BLD AUTO: 0.04 THOUSANDS/ÂΜL (ref 0–0.1)
BASOPHILS NFR BLD AUTO: 1 % (ref 0–1)
BILIRUB SERPL-MCNC: 1.22 MG/DL (ref 0.2–1)
BUN SERPL-MCNC: 12 MG/DL (ref 5–25)
CALCIUM SERPL-MCNC: 9.3 MG/DL (ref 8.4–10.2)
CHLORIDE SERPL-SCNC: 105 MMOL/L (ref 96–108)
CO2 SERPL-SCNC: 27 MMOL/L (ref 21–32)
CREAT SERPL-MCNC: 1.29 MG/DL (ref 0.6–1.3)
EOSINOPHIL # BLD AUTO: 0.24 THOUSAND/ÂΜL (ref 0–0.61)
EOSINOPHIL NFR BLD AUTO: 5 % (ref 0–6)
ERYTHROCYTE [DISTWIDTH] IN BLOOD BY AUTOMATED COUNT: 13.2 % (ref 11.6–15.1)
GFR SERPL CREATININE-BSD FRML MDRD: 71 ML/MIN/1.73SQ M
GLUCOSE P FAST SERPL-MCNC: 89 MG/DL (ref 65–99)
HCT VFR BLD AUTO: 45.7 % (ref 36.5–49.3)
HGB BLD-MCNC: 15.2 G/DL (ref 12–17)
IMM GRANULOCYTES # BLD AUTO: 0.02 THOUSAND/UL (ref 0–0.2)
IMM GRANULOCYTES NFR BLD AUTO: 0 % (ref 0–2)
LYMPHOCYTES # BLD AUTO: 2.53 THOUSANDS/ÂΜL (ref 0.6–4.47)
LYMPHOCYTES NFR BLD AUTO: 47 % (ref 14–44)
MCH RBC QN AUTO: 30.5 PG (ref 26.8–34.3)
MCHC RBC AUTO-ENTMCNC: 33.3 G/DL (ref 31.4–37.4)
MCV RBC AUTO: 92 FL (ref 82–98)
MONOCYTES # BLD AUTO: 0.53 THOUSAND/ÂΜL (ref 0.17–1.22)
MONOCYTES NFR BLD AUTO: 10 % (ref 4–12)
NEUTROPHILS # BLD AUTO: 1.99 THOUSANDS/ÂΜL (ref 1.85–7.62)
NEUTS SEG NFR BLD AUTO: 37 % (ref 43–75)
NRBC BLD AUTO-RTO: 0 /100 WBCS
PLATELET # BLD AUTO: 258 THOUSANDS/UL (ref 149–390)
PMV BLD AUTO: 9.6 FL (ref 8.9–12.7)
POTASSIUM SERPL-SCNC: 4.6 MMOL/L (ref 3.5–5.3)
PROT SERPL-MCNC: 7.1 G/DL (ref 6.4–8.4)
RBC # BLD AUTO: 4.99 MILLION/UL (ref 3.88–5.62)
SODIUM SERPL-SCNC: 138 MMOL/L (ref 135–147)
WBC # BLD AUTO: 5.35 THOUSAND/UL (ref 4.31–10.16)

## 2023-01-13 ENCOUNTER — OFFICE VISIT (OUTPATIENT)
Dept: INFECTIOUS DISEASES | Facility: CLINIC | Age: 38
End: 2023-01-13

## 2023-01-13 VITALS
DIASTOLIC BLOOD PRESSURE: 65 MMHG | HEART RATE: 69 BPM | TEMPERATURE: 97.7 F | WEIGHT: 158 LBS | HEIGHT: 69 IN | SYSTOLIC BLOOD PRESSURE: 108 MMHG | RESPIRATION RATE: 18 BRPM | OXYGEN SATURATION: 97 % | BODY MASS INDEX: 23.4 KG/M2

## 2023-01-13 DIAGNOSIS — N41.1 CHRONIC PROSTATITIS: Primary | ICD-10-CM

## 2023-01-13 DIAGNOSIS — N45.1 EPIDIDYMITIS: ICD-10-CM

## 2023-01-13 RX ORDER — SULFAMETHOXAZOLE AND TRIMETHOPRIM 800; 160 MG/1; MG/1
1 TABLET ORAL EVERY 12 HOURS SCHEDULED
COMMUNITY
End: 2023-01-13 | Stop reason: SDUPTHER

## 2023-01-13 RX ORDER — SULFAMETHOXAZOLE AND TRIMETHOPRIM 800; 160 MG/1; MG/1
1 TABLET ORAL EVERY 12 HOURS SCHEDULED
Qty: 60 TABLET | Refills: 0 | Status: SHIPPED | OUTPATIENT
Start: 2023-01-13 | End: 2023-02-12

## 2023-01-13 NOTE — PROGRESS NOTES
Progress Note - Infectious Disease   Kimmy Cruz 30 y o  male MRN: [id number]  Unit/Bed#:  Encounter: [id number]      Impression/Plan:  1  Chronic prostatitis  Initially evaluated late July 2022 with symptoms that had been ongoing about 2 months  Urine cultures were negative  Serial semen cultures have only grown mixed skin north, including staph epi which are likely not clinically significant  Symptoms have been refractory to Cipro/Levaquin, partially responsive to Doxycyline previously  Now most recently on prolonged course of Bactrim with notable improvement in symptoms but not yet with complete resolution  Previous CT was unremarkable  US with left varicocele  Prior GC/Chlamydia was negative  Patient has also been seen by urology  Will continue on Bactrim for now until resolution of symptoms  ? Extend Bactrim for at least an additional month  ? Continue to follow symptoms  ? Followup in 4 weeks  ? Check CBC with differential, BMP prior to next visit  ? Pt has f/u appt with urology      Antibiotics: Bactrim        Subjective:  Patient is here for regular follow-up regarding management of chronic prostatitis  Patient appears to initially have developed urinary symptoms sometime in May or June 2022  He was evaluated in late July and underwent urine culture which was negative, as well as ultrasound which showed only left varicocele  He was treated with courses of fluoroquinolones without improvement in symptoms  He was later treated with a course of doxycycline, which provided some relief of his dysuria and testicular swelling, as well as pressure and intermittent pain at the site of prostate  However, symptoms appear to relapse while he was still on doxycycline  He underwent multiple semen cultures, which only showed mixed skin north including staph epidermidis  Most recently, he remains on 6-week course of Bactrim and is due to complete it in a few days    Dysuria and testicular swelling have improved but he still has what feels like prostate pressure and discomfort  No fevers, chills or other systemic complaints  No reported issues with the Bactrim  He overall does feel like his symptoms are getting better but have not yet resolved  Reviewed and updated as appropriate: Past medical history, past surgical history, social history, current medications, allergies, problem list     Objective:  Vitals:  [unfilled]  [email or web address] com: Temperature: 97 7 °F (36 5 °C)    Physical Exam:   General:  Well-nourished, well-developed, in no acute distress  Eyes:  Conjunctive clear with no hemorrhages or effusions  Oropharynx:  No ulcers, no lesions  Neck:  Supple, trachea midline  Lungs: Unlabored respirations  Abdomen: Nondistended  Extremities:  No peripheral cyanosis, clubbing, or edema  Skin:  No visible rashes or draining wounds  Neurological:  Moves all four extremities spontaneously    Lab Results:  I have personally reviewed pertinent labs  Results from last 7 days   Lab Units 01/11/23  0614   POTASSIUM mmol/L 4 6   CHLORIDE mmol/L 105   CO2 mmol/L 27   BUN mg/dL 12   CREATININE mg/dL 1 29   EGFR ml/min/1 73sq m 71   CALCIUM mg/dL 9 3   AST U/L 25   ALT U/L 13   ALK PHOS U/L 61     Results from last 7 days   Lab Units 01/11/23  0614   WBC Thousand/uL 5 35   HEMOGLOBIN g/dL 15 2   PLATELETS Thousands/uL 258           Imaging Studies:   I have personally reviewed pertinent imaging study reports and images in PACS  EKG, Pathology, and Other Studies:   I have personally reviewed pertinent reports

## 2023-01-26 ENCOUNTER — PROCEDURE VISIT (OUTPATIENT)
Dept: UROLOGY | Facility: AMBULATORY SURGERY CENTER | Age: 38
End: 2023-01-26

## 2023-01-26 VITALS
SYSTOLIC BLOOD PRESSURE: 110 MMHG | HEART RATE: 64 BPM | WEIGHT: 158 LBS | BODY MASS INDEX: 23.4 KG/M2 | OXYGEN SATURATION: 98 % | DIASTOLIC BLOOD PRESSURE: 68 MMHG | HEIGHT: 69 IN

## 2023-01-26 DIAGNOSIS — N41.1 CHRONIC PROSTATITIS: Primary | ICD-10-CM

## 2023-01-26 NOTE — PROGRESS NOTES
Assessment/Plan:    Chronic prostatitis  We again discussed the patient's chronic  pain  We have had numerous discussions about this in the past including the fact I am not sure what the etiology of the patient's pain is  I am glad he is getting infectious disease input  If his pain continues I do not favor continued antibiotics and would instead consider neuropathic pain  The patient brought up bacterophage therapy but I am not familiar with this and do not know where it can be offered    He will call me back if/when he like to see me  We also discussed the role for a second opinion which can be done through [facility] or another facility  Subjective:      Patient ID: Gabriele Ruff is a 30 y o  male  HPI        31 y  o  male  with history of what has become chronic testicular pain assumed to be epididymitis and/or prostatitis         7/28/2022 seen at urgent care for symptoms of dysuria and testicular discomfort   Patient states he engaged in sexual activity a few weeks prior as well as mountain biking for 3 consecutive days  Mellisa Godinez notes mild redness and swelling   Patient was presumed to have epididymitis and was prescribed a 7 day course of Levaquin   Urine culture and GC chlamydia testing were negative        August 5, 2022 seen in urology clinic and reported testicular pressure, discomfort and eden rectal area and feeling of burning sensation   Patient has since had sexual activity with same partner  Mellisa Godinez denies any pain with ejaculation or hematospermia   Patient denies any additional lower urinary tract symptoms or gross hematuria       We obtained a semen culture (coag neg staph only) and urine culture which was negative   Scrotal ultrasound was overall unremarkable with a mild left varicocele      August 26th 2022 pt prented to the ER for persistent symptoms of a burning sensation with urination and ejaculation   At that time he was finishing a course of doxycycline   CT scan was obtained and was unremarkable other than constipation  [address] work including RPR, CBC, UA and CMP were unremarkable     Sep 8th 2022 seen in urology clinic and said doxycycline overall helped his pain some but that improvement plateaued and he is still having issues   He now thinks his pain is more cyst localized to the prostate  Catherine Canas is very frustrated and upset about his course  We switched his antibiotics cefdinir at his request (but did not take it) and place referral to Infectious Disease and repeated semen culture which showed no growth      September 26, 2022 seen in infectious disease clinic and was transitioned to Bactrim for 2 weeks  CRP and ESR checked and normal in September and October      October 10, 2022 seen back in ID clinic and reports some improvement in his overall pain and testicular pressure on Bactrim so course was extended to be a another 4 weeks      November 22, 2022:  Patient reports his pain was somewhat better on Bactrim but he is now done was course any think his pain is worse again  He reports bilateral testicular pain as well as prostatic discomfort and also feels like his bilateral inguinal lymph nodes are enlarged and "on fire" by the end of the day  Also reports dysuria  Jan 26 2023: Was scheduled for cystoscopy (which we previously discussed as low yield but reasonable) but the patient did not want to move forward with this  He has seen infectious disease a few times since our last visit  He has had an extended course of Bactrim receiving to slowly helping his symptoms  He reports his intermittent issues where he could have dysuria or pain in the back of the testicles perineum area  No relation to drinking fluids  No other issues  CX data:  7/28/22 Ucx: negative   7/28/22 GC: negative  8/08/22 semen cx: Coag neg staph  9/01/22 semen cx: 1+ staph epi  9/21/22 semen cx: no growth        He denies any prior urologic history, surgical intervention, or instrumentation   Patient denies any prior history of undescended testicle or testicular torsion  Garry Ochoa states his grandfather had a history of prostate cancer  Past Surgical History:   Procedure Laterality Date   • HERNIA REPAIR  0    8 months old        Past Medical History:   Diagnosis Date   • Epididymitis 07/28/2022             Review of Systems   Constitutional: Negative for chills and fever  HENT: Negative for ear pain and sore throat  Eyes: Negative for pain and visual disturbance  Respiratory: Negative for cough and shortness of breath  Cardiovascular: Negative for chest pain and palpitations  Gastrointestinal: Negative for abdominal pain and vomiting  Genitourinary: Positive for dysuria and testicular pain  Negative for hematuria  Musculoskeletal: Negative for arthralgias and back pain  Skin: Negative for color change and rash  Neurological: Negative for seizures and syncope  All other systems reviewed and are negative  Objective:      /68 (BP Location: Left arm, Patient Position: Sitting, Cuff Size: Adult)   Pulse 64   Ht 5' 9" (1 753 m)   Wt 71 7 kg (158 lb)   SpO2 98%   BMI 23 33 kg/m²     No results found for: PSA       Physical Exam  Vitals reviewed  Constitutional:       Appearance: Normal appearance  He is normal weight  HENT:      Head: Normocephalic and atraumatic  Eyes:      Pupils: Pupils are equal, round, and reactive to light  Abdominal:      General: Abdomen is flat  Neurological:      General: No focal deficit present  Mental Status: He is alert and oriented to person, place, and time  Psychiatric:         Mood and Affect: Mood normal          Thought Content: Thought content normal            Orders  No orders of the defined types were placed in this encounter

## 2023-01-26 NOTE — ASSESSMENT & PLAN NOTE
We again discussed the patient's chronic  pain  We have had numerous discussions about this in the past including the fact I am not sure what the etiology of the patient's pain is  I am glad he is getting infectious disease input  If his pain continues I do not favor continued antibiotics and would instead consider neuropathic pain  The patient brought up bacterophage therapy but I am not familiar with this and do not know where it can be offered    He will call me back if/when he like to see me  We also discussed the role for a second opinion which can be done through [address] or another facility

## 2023-02-07 ENCOUNTER — OFFICE VISIT (OUTPATIENT)
Dept: INFECTIOUS DISEASES | Facility: CLINIC | Age: 38
End: 2023-02-07

## 2023-02-07 VITALS
TEMPERATURE: 97.3 F | RESPIRATION RATE: 18 BRPM | HEIGHT: 69 IN | OXYGEN SATURATION: 98 % | BODY MASS INDEX: 23.4 KG/M2 | WEIGHT: 158 LBS | HEART RATE: 74 BPM | SYSTOLIC BLOOD PRESSURE: 105 MMHG | DIASTOLIC BLOOD PRESSURE: 68 MMHG

## 2023-02-07 DIAGNOSIS — N41.1 CHRONIC PROSTATITIS: ICD-10-CM

## 2023-02-07 RX ORDER — SULFAMETHOXAZOLE AND TRIMETHOPRIM 800; 160 MG/1; MG/1
1 TABLET ORAL EVERY 12 HOURS SCHEDULED
Qty: 60 TABLET | Refills: 0 | Status: SHIPPED | OUTPATIENT
Start: 2023-02-07 | End: 2023-03-09

## 2023-02-07 NOTE — PROGRESS NOTES
Assessment/Plan:    Chronic prostatitis  1  Chronic prostatitis  Initially evaluated late July 2022 with symptoms that had been ongoing about 2 months  Urine cultures were negative  Serial semen cultures have only grown mixed skin north, including staph epi which are likely not clinically significant  Symptoms have been refractory to Cipro/Levaquin, partially responsive to Doxycyline previously  Now most recently on prolonged course of Bactrim with notable improvement in symptoms but not yet with complete resolution  Previous CT was unremarkable  US with left varicocele  Prior GC/Chlamydia was negative  Patient has also been seen by urology  Will continue on Bactrim for now until resolution of symptoms  Last Urology note states possible neuropathic cause of symptoms  Patient states he is about 80% better and would like to continue on with the Bactrim  Discussed with patient that we could continue another but at that point if he is still having symptoms may need to re evaluate the treatment plan  ? Extend Bactrim for an additional month  ? Continue to follow symptoms  ? Followup in 4 weeks  ? Check CBCD, BMP this week or next         Diagnoses and all orders for this visit:    Chronic prostatitis  -     sulfamethoxazole-trimethoprim (BACTRIM DS) 800-160 mg per tablet; Take 1 tablet by mouth every 12 (twelve) hours  -     CBC and differential; Future  -     Basic metabolic panel; Future          Subjective:      Patient ID: Arely Copeland is a 30 y o  male  HPI  31 y/o male presents for office follow up today regarding chronic prostatitis  Patient remains on bactrim  While he continues to have gradual improvement her still had some symptoms  He continues to have intermittent pain after ejaculation  He feels at this point he is 80% improved   note reviewed and consider potential neuropathic pain    The following portions of the patient's history were reviewed and updated as appropriate: allergies, current medications, past family history, past medical history, past social history, past surgical history and problem list     Review of Systems   Constitutional: Negative for chills and fever  Respiratory: Negative for cough and shortness of breath  Gastrointestinal: Negative for abdominal pain, diarrhea, nausea and vomiting  Genitourinary:        Sporadic pain after ejaculation   Skin: Negative for rash  Psychiatric/Behavioral: Negative for behavioral problems and confusion  Objective:      /68   Pulse 74   Temp (!) 97 3 °F (36 3 °C)   Resp 18   Ht 5' 9" (1 753 m)   Wt 71 7 kg (158 lb)   SpO2 98%   BMI 23 33 kg/m²          Physical Exam  Vitals reviewed  Constitutional:       General: He is not in acute distress  Appearance: Normal appearance  He is not ill-appearing, toxic-appearing or diaphoretic  HENT:      Head: Normocephalic and atraumatic  Eyes:      General: No scleral icterus  Right eye: No discharge  Left eye: No discharge  Conjunctiva/sclera: Conjunctivae normal    Cardiovascular:      Rate and Rhythm: Normal rate  Pulmonary:      Effort: Pulmonary effort is normal  No respiratory distress  Breath sounds: Normal breath sounds  No stridor  No wheezing, rhonchi or rales  Chest:      Chest wall: No tenderness  Abdominal:      General: Bowel sounds are normal  There is no distension  Palpations: Abdomen is soft  Tenderness: There is no abdominal tenderness  Skin:     General: Skin is warm and dry  Coloration: Skin is not jaundiced or pale  Findings: No erythema or rash  Neurological:      Mental Status: He is alert and oriented to person, place, and time     Psychiatric:         Mood and Affect: Mood normal          Behavior: Behavior normal              Labs:   none

## 2023-02-09 ENCOUNTER — APPOINTMENT (OUTPATIENT)
Dept: LAB | Facility: CLINIC | Age: 38
End: 2023-02-09

## 2023-02-09 DIAGNOSIS — N41.1 CHRONIC PROSTATITIS: ICD-10-CM

## 2023-02-09 LAB
ANION GAP SERPL CALCULATED.3IONS-SCNC: 7 MMOL/L (ref 4–13)
BASOPHILS # BLD AUTO: 0.07 THOUSANDS/ÂΜL (ref 0–0.1)
BASOPHILS NFR BLD AUTO: 1 % (ref 0–1)
BUN SERPL-MCNC: 15 MG/DL (ref 5–25)
CALCIUM SERPL-MCNC: 9 MG/DL (ref 8.4–10.2)
CHLORIDE SERPL-SCNC: 103 MMOL/L (ref 96–108)
CO2 SERPL-SCNC: 25 MMOL/L (ref 21–32)
CREAT SERPL-MCNC: 1.08 MG/DL (ref 0.6–1.3)
EOSINOPHIL # BLD AUTO: 0.15 THOUSAND/ÂΜL (ref 0–0.61)
EOSINOPHIL NFR BLD AUTO: 3 % (ref 0–6)
ERYTHROCYTE [DISTWIDTH] IN BLOOD BY AUTOMATED COUNT: 13 % (ref 11.6–15.1)
GFR SERPL CREATININE-BSD FRML MDRD: 88 ML/MIN/1.73SQ M
GLUCOSE SERPL-MCNC: 81 MG/DL (ref 65–140)
HCT VFR BLD AUTO: 44.6 % (ref 36.5–49.3)
HGB BLD-MCNC: 14.8 G/DL (ref 12–17)
IMM GRANULOCYTES # BLD AUTO: 0.02 THOUSAND/UL (ref 0–0.2)
IMM GRANULOCYTES NFR BLD AUTO: 0 % (ref 0–2)
LYMPHOCYTES # BLD AUTO: 2.27 THOUSANDS/ÂΜL (ref 0.6–4.47)
LYMPHOCYTES NFR BLD AUTO: 38 % (ref 14–44)
MCH RBC QN AUTO: 30.1 PG (ref 26.8–34.3)
MCHC RBC AUTO-ENTMCNC: 33.2 G/DL (ref 31.4–37.4)
MCV RBC AUTO: 91 FL (ref 82–98)
MONOCYTES # BLD AUTO: 0.61 THOUSAND/ÂΜL (ref 0.17–1.22)
MONOCYTES NFR BLD AUTO: 10 % (ref 4–12)
NEUTROPHILS # BLD AUTO: 2.94 THOUSANDS/ÂΜL (ref 1.85–7.62)
NEUTS SEG NFR BLD AUTO: 48 % (ref 43–75)
NRBC BLD AUTO-RTO: 0 /100 WBCS
PLATELET # BLD AUTO: 270 THOUSANDS/UL (ref 149–390)
PMV BLD AUTO: 9.8 FL (ref 8.9–12.7)
POTASSIUM SERPL-SCNC: 3.7 MMOL/L (ref 3.5–5.3)
RBC # BLD AUTO: 4.91 MILLION/UL (ref 3.88–5.62)
SODIUM SERPL-SCNC: 135 MMOL/L (ref 135–147)
WBC # BLD AUTO: 6.06 THOUSAND/UL (ref 4.31–10.16)

## 2023-03-05 NOTE — PROGRESS NOTES
Progress Note - Infectious Disease   Lencho Speck 30 y o  male MRN: [id number]  Unit/Bed#:  Encounter: [id number]      Impression/Plan:  1  Chronic prostatitis and possible chronic pelvic pain disorder developing  Initially evaluated in July 2022  Cultures have been largely unrevealing including semen cultures  Symptoms were refractory to Cipro/Levaquin and partially responsive to doxycycline  Completed 6-week course of Bactrim at the end of October  Has now been on Bactrim again for 3 months with overall improvement/now plateaued improvement  Continues to utilize other herbal supplements as well  Reviewed recent urology evaluation  We discussed in detail that patient is unlikely to benefit at this point from further antibiotic  He is agreeable to stopping and monitoring going forward  He is aware that additional work-up would be needed if symptoms recur  We also discussed avoiding empiric antibiotic unless definitive culture data is present to guide therapy  Would stop further antibiotic therapy today  Recommend follow-up with urology  Encouraged patient to consider other therapies they may offer  Encouraged patient to explore other alternative remedies as he has found benefit  Encourage patient to seek other opinions and providers  No further lab monitoring at this time  Patient would like to follow-up in a few weeks to assess for recurrence  Office to schedule follow-up appointment in 4 to 5 weeks  Would avoid any empiric antibiotic therapy with recurrent/intermittent symptoms  Would recommend repeat urine and semen testing if symptoms recur first  Would recommend reevaluation by urology if symptoms recur  Recommend follow-up otherwise with PCP    Above plan was discussed in detail with the Patient     We will forward office visit to PCP and ID colleague    RTO in 4 to 5 weeks     I have spent a total time of 35 minutes on 3/10/23 in caring for this patient including Diagnostic results, Impressions, Documenting in the medical record, Reviewing / ordering tests, medicine, procedures  , Obtaining or reviewing history   and Communicating with other healthcare professionals   Antibiotics:  Bactrim 12/5--ongoing    Subjective: This patient is known to our service and has been followed for treatment regarding chronic prostatitis  He recently completed a 6-week course of therapy towards the end of October  Symptoms later recurred and so he was restarted on another course on 12/5  Patient since then has been seen by our office and had been reporting subjective improvement and so antibiotic course was ultimately extended  Patient is now close to being on Bactrim for 3 months  Recent urology visit noted as well  He presents for follow-up  On evaluation, the patient reports feeling about 90% better in terms of symptoms  Overall reports that his improvement seems to have plateaued  He continues to use various herbal supplements that he thinks are contributing to improvement  He mentions tension in his muscles in the pelvis itself that he has noticed improvement while taking colloidal silver  He still notices some abnormal coloration to the semen  Has not had recent burning with urination  We did discuss the possibility at this point that he may be developing a chronic pelvic pain/dysfunction syndrome which could have been propagated by his infection  Patient seems reluctant to consider this diagnosis  This was also discussed at his urology evaluation  I did encourage the patient to continue to seek alternative measures or other opinions if he is finding benefit  I encouraged him to consider alternative therapies that urology may be able to offer as they may add to what he is already doing  We did however discussed that there is no role for further antibiotics at this point    I expressed that I do not think that further antibiotics is going to provide him any further benefit and there are risks associated with continued therapy  The patient is in agreement  He is going to stop antibiotics after visit today  He would like to however follow-up in a few weeks to assess for any recurrence  I did explain to the patient that I would not restart antibiotics empirically  If he were to develop recurrent symptoms that we would want repeat urine cultures, semen specimens prior to considering any therapy we would want to see culture proven evidence of recurrence  He expressed understanding  I recommended that he follow-up with urology as well  Additional questions answered  ROS: A complete 12 point ROS is negative other than that noted in the HPI    Followup portions patient history reviewed and updated as: Allergies, current medications, past medical history, past social history, past surgical history, and the problem list    Objective:  Vitals:  Vitals:    03/10/23 0859   BP: 108/60   Pulse: 60   Temp: (!) 97 2 °F (36 2 °C)   SpO2: 98%   Weight: 71 7 kg (158 lb)   Height: 5' 9" (1 753 m)       Physical Exam:   General Appearance:  Alert, interactive, appearing well, nontoxic, no acute distress  Slightly flat/depressed affect  Patient however is engaged in conversation and discussion throughout the visit  Throat: Oropharynx moist without lesions  Lungs:   Clear to auscultation bilaterally; no audible wheezes, rhonchi or rales; respirations unlabored   Heart:  RRR; no murmur, rub or gallop   Abdomen:   Soft, non-tender, non-distended, positive bowel sounds  Extremities: No clubbing, cyanosis or edema   Skin: No new rashes or lesions  No new draining wounds         Labs, Imaging, & Other studies:   All pertinent labs and imaging studies were personally reviewed as below    Lab Results   Component Value Date    K 3 7 02/09/2023     02/09/2023    CO2 25 02/09/2023    BUN 15 02/09/2023    CREATININE 1 08 02/09/2023    GLUF 89 01/11/2023    CALCIUM 9 0 02/09/2023    AST 25 01/11/2023 ALT 13 01/11/2023    ALKPHOS 61 01/11/2023    EGFR 88 02/09/2023     Lab Results   Component Value Date    WBC 6 06 02/09/2023    HGB 14 8 02/09/2023    HCT 44 6 02/09/2023    MCV 91 02/09/2023     02/09/2023   No results found for: SEDRATE    Recent Lab interpretation/comments: Recent chemistry and CBC on 2/9 unremarkable  Prior sed rate and CRP is negative  Recent Imaging interpretation/comments: No recent imaging of the abdomen noted on chart review  Culture data: Prior semen cultures reviewed which were essentially unremarkable  Prior UA from September and August unremarkable  Previous STI testing also negative  External/inpatient provider notes: Previous neurology visits noted in [place] and reviewed

## 2023-03-10 ENCOUNTER — OFFICE VISIT (OUTPATIENT)
Dept: INFECTIOUS DISEASES | Facility: CLINIC | Age: 38
End: 2023-03-10

## 2023-03-10 VITALS
BODY MASS INDEX: 23.4 KG/M2 | DIASTOLIC BLOOD PRESSURE: 60 MMHG | WEIGHT: 158 LBS | OXYGEN SATURATION: 98 % | HEIGHT: 69 IN | HEART RATE: 60 BPM | SYSTOLIC BLOOD PRESSURE: 108 MMHG | TEMPERATURE: 97.2 F

## 2023-03-10 DIAGNOSIS — N41.1 CHRONIC PROSTATITIS: Primary | ICD-10-CM

## 2023-03-10 NOTE — PATIENT INSTRUCTIONS
Please stop further antibiotic as discussed   Continue with other supplements that you have been taking if you find benefit   No further lab work from our office  4 week follow with ID office

## 2023-04-24 ENCOUNTER — DOCUMENTATION (OUTPATIENT)
Dept: INFECTIOUS DISEASES | Facility: CLINIC | Age: 38
End: 2023-04-24

## 2023-04-24 NOTE — PROGRESS NOTES
Urine studies reviewed  UA without pyuria, urine culture no growth, GC/CT NAAT and mycoplasma genitalium NAAT negative  Semun culture still pending  At this time, there is no current signs of infection present  I recommend follow up with urology and his PCP to discuss symptomatic management and further work up for chronic pain

## 2023-04-26 ENCOUNTER — APPOINTMENT (OUTPATIENT)
Dept: LAB | Facility: CLINIC | Age: 38
End: 2023-04-26

## 2023-04-26 DIAGNOSIS — N41.1 CHRONIC PROSTATITIS: ICD-10-CM

## 2023-04-28 LAB — BACTERIA SMN CULT: NORMAL

## 2023-05-26 ENCOUNTER — TELEPHONE (OUTPATIENT)
Dept: INFECTIOUS DISEASES | Facility: CLINIC | Age: 38
End: 2023-05-26

## 2023-05-26 NOTE — TELEPHONE ENCOUNTER
Patient calls today regarding continued symptoms  He states that he is going to see somebody else regarding this and would like us to release his records  He was informed he can come in to fill out the form, and we can take care of that

## 2023-05-31 ENCOUNTER — DOCUMENTATION (OUTPATIENT)
Dept: OTHER | Facility: HOSPITAL | Age: 38
End: 2023-05-31

## 2023-05-31 NOTE — PROGRESS NOTES
The patient reached out to our office requesting a referral for [facility] therapy for chronic pelvic pain/prostatitis  He had already reached out to the [facility] and they requested an ID contact in our office and medical records  These were faxed by our office and I emailed the Coordinator about his case  I again expressed my recommendation to the patient to obtain a second opinion with Urology as his symptoms at this time appear most consistent with chronic pelvic pain syndrome rather than infection  We will follow up the program's recommendations and provide additional information as needed

## 2023-06-06 ENCOUNTER — DOCUMENTATION (OUTPATIENT)
Dept: OTHER | Facility: HOSPITAL | Age: 38
End: 2023-06-06

## 2023-06-21 NOTE — ASSESSMENT & PLAN NOTE
1  Chronic prostatitis  Initially evaluated late July 2022 with symptoms that had been ongoing about 2 months  Urine cultures were negative  Serial semen cultures have only grown mixed skin north, including staph epi which are likely not clinically significant  Symptoms have been refractory to Cipro/Levaquin, partially responsive to Doxycyline previously  Now most recently on prolonged course of Bactrim with notable improvement in symptoms but not yet with complete resolution  Previous CT was unremarkable  US with left varicocele  Prior GC/Chlamydia was negative  Patient has also been seen by urology  Will continue on Bactrim for now until resolution of symptoms  Last Urology note states possible neuropathic cause of symptoms  Patient states he is about 80% better and would like to continue on with the Bactrim  Discussed with patient that we could continue another but at that point if he is still having symptoms may need to re evaluate the treatment plan  ? Extend Bactrim for an additional month  ? Continue to follow symptoms  ? Followup in 4 weeks  ?  Check CBCD, BMP this week or next
show

## 2023-08-15 ENCOUNTER — RA CDI HCC (OUTPATIENT)
Dept: OTHER | Facility: HOSPITAL | Age: 38
End: 2023-08-15

## 2023-08-15 NOTE — PROGRESS NOTES
[facility] coding opportunities       Chart reviewed, no opportunity found: CHART REVIEWED, NO OPPORTUNITY FOUND        Patients Insurance        Commercial Insurance: Korey Quintanilla

## 2024-10-19 ENCOUNTER — OFFICE VISIT (OUTPATIENT)
Dept: URGENT CARE | Age: 39
End: 2024-10-19
Payer: COMMERCIAL

## 2024-10-19 VITALS
SYSTOLIC BLOOD PRESSURE: 124 MMHG | HEIGHT: 69 IN | DIASTOLIC BLOOD PRESSURE: 78 MMHG | TEMPERATURE: 96.7 F | HEART RATE: 78 BPM | BODY MASS INDEX: 24.88 KG/M2 | RESPIRATION RATE: 16 BRPM | WEIGHT: 168 LBS | OXYGEN SATURATION: 99 %

## 2024-10-19 DIAGNOSIS — H00.025 HORDEOLUM INTERNUM OF LEFT LOWER EYELID: Primary | ICD-10-CM

## 2024-10-19 PROCEDURE — S9083 URGENT CARE CENTER GLOBAL: HCPCS

## 2024-10-19 PROCEDURE — G0383 LEV 4 HOSP TYPE B ED VISIT: HCPCS

## 2024-10-19 RX ORDER — ERYTHROMYCIN 5 MG/G
0.5 OINTMENT OPHTHALMIC EVERY 12 HOURS SCHEDULED
Qty: 14 G | Refills: 0 | Status: SHIPPED | OUTPATIENT
Start: 2024-10-19 | End: 2024-10-26

## 2024-10-19 NOTE — PROGRESS NOTES
". [facility]        NAME: German Blank is a 38 y.o. male  : 1985    MRN: [id number]  DATE: 2024  TIME: 3:24 PM    Assessment and Plan   Hordeolum internum of left lower eyelid [H00.025]  1. Hordeolum internum of left lower eyelid  erythromycin (ILOTYCIN) ophthalmic ointment      Please begin antibiotic ointment as directed.   Apply moist warm compresses for 10-15 minutes at a time, 3-5 times per day.   Follow up with PCP if no relief within one week.       Patient Instructions   Patient Education     Stye   The Basics   Written by the doctors and editors at [facility]   What is a stye? -- A stye is a red and painful lump on the eyelid. It happens when a small gland on the edge of the eyelid gets infected or inflamed. Styes can form on the upper or lower eyelids. Most styes get better on their own after a few days to a week. The medical term for stye is \"hordeolum.\"  People sometimes get a stye confused with a different eye problem called a \"chalazion.\" A chalazion also causes a lump on the eyelid. But a stye is caused by an infection and is painful. A chalazion is not tender or painful, but it often lasts longer than a stye does.  What are the symptoms of a stye? -- People who have a stye have a painful lump on the edge of their eyelid (picture 1). The lump might look red, swollen, or similar to a pimple.  A stye usually develops over a few days. Styes can cause other symptoms, too, such as tearing and eyelid pain and swelling.  Is there a test for a stye? -- No. But your doctor or nurse should be able to tell if you have a stye by talking with you and doing an exam.  Is there anything I can do on my own? -- Yes:   Put a warm, wet compress on the stye. Wet a clean washcloth with warm water, and put it over your stye. When the washcloth cools, reheat it with warm water and put it back over the stye. Repeat these steps for about 15 minutes, and try to do this 4 times a day.   It might " help to gently massage your eyelid.   Do not squeeze or try to pop your stye. This can make it worse.   Do not wear eye makeup or contact lenses until your stye is gone.  What treatments might my doctor use? -- If your stye doesn't get better or if it leads to other problems, your doctor might:   Prescribe a cream or ointment that goes in the eye and on the eyelid   Prescribe antibiotic medicines   Do a procedure to drain the stye  Can styes be prevented? -- Yes. To lower your chances of getting a stye, you can:   Wash your hands often - It's especially important to wash your hands before you touch your eyes. Also, if you wear contact lenses, keep them clean and wash your hands before you put them in.   Be careful with your eye makeup - Wearing eye makeup can sometimes cause a stye. Remove your eye makeup each night, and throw away old makeup. Do not share eye makeup with other people.  When should I call the doctor? -- Call your doctor or nurse for advice if:   Your stye doesn't go away after using warm compresses for 1 to 2 weeks.   Your stye gets very big, bleeds, or affects your vision.   Your whole eye is red, or your whole eyelid is red or swollen.   The redness or swelling spreads to your cheek or other parts of your face.  All topics are updated as new evidence becomes available and our peer review process is complete.  This topic retrieved from [employer] on: Feb 26, 2024.  Topic 88125 Version 17.0  Release: 32.2.4 - C32.56  © 2024 UpToDate, Inc. and/or its affiliates. All rights reserved.  picture 1: Stye     This person has a stye on the lower eyelid.  Graphic 38800 Version 2.0  Consumer Information Use and Disclaimer   Disclaimer: This generalized information is a limited summary of diagnosis, treatment, and/or medication information. It is not meant to be comprehensive and should be used as a tool to help the user understand and/or assess potential diagnostic and treatment options. It does NOT include all  information about conditions, treatments, medications, side effects, or risks that may apply to a specific patient. It is not intended to be medical advice or a substitute for the medical advice, diagnosis, or treatment of a health care provider based on the health care provider's examination and assessment of a patient's specific and unique circumstances. Patients must speak with a health care provider for complete information about their health, medical questions, and treatment options, including any risks or benefits regarding use of medications. This information does not endorse any treatments or medications as safe, effective, or approved for treating a specific patient. UpToDate, Inc. and its affiliates disclaim any warranty or liability relating to this information or the use thereof.The use of this information is governed by the Terms of Use, available at https://www.[employer].com/en/know/clinical-effectiveness-terms. 2024© UpToDate, Inc. and its affiliates and/or licensors. All rights reserved.  Copyright   © 2024 Up[employer]Date, Inc. and/or its affiliates. All rights reserved.       Follow up with PCP in 3-5 days.  Proceed to  ER if symptoms worsen.    Chief Complaint     Chief Complaint   Patient presents with    Eye Problem     Pt states his left eye has felt irritated. Has taken benadryl and used eye drops. Nodule in inside of lower lid.          History of Present Illness       Patient is a 38 year old male with no significant PMH who presents for evaluation of redness, swelling and lump of left lower eyelid over the past few days. He denies trauma/injury, drainage, eye pain or vision changes, contact lens use. He has been trying OTC eye drops with no relief.         Review of Systems   Review of Systems   Constitutional:  Negative for fatigue and fever.   HENT:  Negative for congestion, ear discharge, ear pain, postnasal drip, rhinorrhea, sinus pressure, sinus pain, sneezing and sore throat.    Eyes:  Negative.  Negative for photophobia, pain, discharge, redness, itching and visual disturbance.        Eyelid swelling/redness/lump   Respiratory: Negative.  Negative for apnea, cough, choking, chest tightness, shortness of breath and stridor.    Cardiovascular: Negative.  Negative for chest pain and palpitations.   Gastrointestinal: Negative.  Negative for diarrhea, nausea and vomiting.   Endocrine: Negative.  Negative for polydipsia, polyphagia and polyuria.   Genitourinary: Negative.  Negative for decreased urine volume and flank pain.   Musculoskeletal: Negative.  Negative for arthralgias, back pain, myalgias, neck pain and neck stiffness.   Skin: Negative.  Negative for color change and rash.   Allergic/Immunologic: Negative.  Negative for environmental allergies.   Neurological: Negative.  Negative for dizziness, facial asymmetry, light-headedness, numbness and headaches.   Hematological: Negative.  Negative for adenopathy.   Psychiatric/Behavioral: Negative.           Current Medications       Current Outpatient Medications:     erythromycin (ILOTYCIN) ophthalmic ointment, Administer 0.5 inches into the left eye every 12 (twelve) hours for 7 days, Disp: 14 g, Rfl: 0    Garlic 100 MG TABS, Take by mouth, Disp: , Rfl:     Lactobacillus Acid-Pectin (ACIDOPHILUS/CITRUS PECTIN PO), Take by mouth, Disp: , Rfl:     multivitamin (THERAGRAN) TABS, Take 1 tablet by mouth daily, Disp: , Rfl:     Current Allergies     Allergies as of 10/19/2024    (No Known Allergies)            The following portions of the patient's history were reviewed and updated as appropriate: allergies, current medications, past family history, past medical history, past social history, past surgical history and problem list.     Past Medical History:   Diagnosis Date    Epididymitis 07/28/2022       Past Surgical History:   Procedure Laterality Date    HERNIA REPAIR  1988    7 months old       Family History   Problem Relation Age of Onset     "Urolithiasis Mother         on and off    No Known Problems Father     Cancer Maternal Grandmother         lung cancer/     Cancer Paternal Grandfather         Prostate cancer/     Diabetes Paternal Grandfather         diagnosed in his 40s         Medications have been verified.        Objective   /78   Pulse 78   Temp (!) 96.7 °F (35.9 °C)   Resp 16   Ht 5' 9\" (1.753 m)   Wt 76.2 kg (168 lb)   SpO2 99%   BMI 24.81 kg/m²        Physical Exam     Physical Exam  Vitals reviewed.   Constitutional:       General: He is not in acute distress.     Appearance: Normal appearance. He is not ill-appearing, toxic-appearing or diaphoretic.      Interventions: He is not intubated.  HENT:      Head: Normocephalic and atraumatic.      Right Ear: Tympanic membrane, ear canal and external ear normal. There is no impacted cerumen.      Left Ear: Tympanic membrane, ear canal and external ear normal. There is no impacted cerumen.      Nose: Nose normal. No congestion or rhinorrhea.      Mouth/Throat:      Mouth: Mucous membranes are moist.      Pharynx: Oropharynx is clear. Uvula midline. No pharyngeal swelling, oropharyngeal exudate, posterior oropharyngeal erythema or uvula swelling.      Tonsils: No tonsillar exudate or tonsillar abscesses. 1+ on the right. 1+ on the left.   Eyes:      General: Lids are everted, no foreign bodies appreciated. Vision grossly intact. Gaze aligned appropriately. No allergic shiner, visual field deficit or scleral icterus.        Left eye: Hordeolum present.No foreign body or discharge.      Extraocular Movements: Extraocular movements intact.      Left eye: Normal extraocular motion and no nystagmus.      Conjunctiva/sclera: Conjunctivae normal.      Left eye: Left conjunctiva is not injected. No chemosis, exudate or hemorrhage.     Pupils: Pupils are equal, round, and reactive to light.        Comments: Left lower eyelid everted to reveal hordeolum internum.  "   Cardiovascular:      Rate and Rhythm: Normal rate and regular rhythm.      Pulses: Normal pulses.      Heart sounds: Normal heart sounds, S1 normal and S2 normal. Heart sounds not distant. No murmur heard.     No friction rub. No gallop.   Pulmonary:      Effort: Pulmonary effort is normal. No tachypnea, bradypnea, accessory muscle usage, prolonged expiration, respiratory distress or retractions. He is not intubated.      Breath sounds: Normal breath sounds. No stridor, decreased air movement or transmitted upper airway sounds. No decreased breath sounds, wheezing, rhonchi or rales.   Chest:      Chest wall: No tenderness.   Musculoskeletal:         General: Normal range of motion.      Cervical back: Normal range of motion and neck supple. No rigidity or tenderness.   Lymphadenopathy:      Cervical: No cervical adenopathy.   Skin:     General: Skin is warm and dry.      Capillary Refill: Capillary refill takes less than 2 seconds.      Findings: No erythema.   Neurological:      General: No focal deficit present.      Mental Status: He is alert.   Psychiatric:         Mood and Affect: Mood normal.

## 2024-10-19 NOTE — PATIENT INSTRUCTIONS
Please begin antibiotic ointment as directed.   Apply moist warm compresses for 10-15 minutes at a time, 3-5 times per day.   Follow up with PCP if no relief within one week.